# Patient Record
Sex: MALE | Race: BLACK OR AFRICAN AMERICAN | Employment: UNEMPLOYED
[De-identification: names, ages, dates, MRNs, and addresses within clinical notes are randomized per-mention and may not be internally consistent; named-entity substitution may affect disease eponyms.]

---

## 2017-01-05 ENCOUNTER — OFFICE VISIT (OUTPATIENT)
Dept: BEHAVIORAL/MENTAL HEALTH | Facility: CLINIC | Age: 67
End: 2017-01-05

## 2017-01-05 DIAGNOSIS — F11.23 OPIOID DEPENDENCE WITH WITHDRAWAL (HCC): Primary | ICD-10-CM

## 2017-01-05 DIAGNOSIS — R52 PAIN: ICD-10-CM

## 2017-01-05 PROCEDURE — 90837 PSYTX W PT 60 MINUTES: CPT | Performed by: PSYCHOLOGIST

## 2017-01-06 ENCOUNTER — TELEPHONE (OUTPATIENT)
Dept: INTERNAL MEDICINE | Facility: CLINIC | Age: 67
End: 2017-01-06

## 2017-01-09 ENCOUNTER — CASE MANAGEMENT (OUTPATIENT)
Dept: BEHAVIORAL/MENTAL HEALTH | Facility: CLINIC | Age: 67
End: 2017-01-09

## 2017-03-01 ENCOUNTER — OFFICE VISIT (OUTPATIENT)
Dept: INTERNAL MEDICINE | Facility: CLINIC | Age: 67
End: 2017-03-01

## 2017-03-01 ENCOUNTER — HOSPITAL ENCOUNTER (OUTPATIENT)
Age: 67
Discharge: HOME | End: 2017-03-01
Payer: MEDICARE

## 2017-03-01 VITALS
HEART RATE: 80 BPM | SYSTOLIC BLOOD PRESSURE: 138 MMHG | WEIGHT: 219 LBS | HEIGHT: 70 IN | DIASTOLIC BLOOD PRESSURE: 88 MMHG | BODY MASS INDEX: 31.35 KG/M2

## 2017-03-01 DIAGNOSIS — F11.20 UNCOMPLICATED OPIOID DEPENDENCE (HCC): Primary | ICD-10-CM

## 2017-03-01 PROCEDURE — 99212 OFFICE O/P EST SF 10 MIN: CPT | Performed by: INTERNAL MEDICINE

## 2017-03-01 PROCEDURE — 99211 OFF/OP EST MAY X REQ PHY/QHP: CPT | Performed by: INTERNAL MEDICINE

## 2017-03-01 RX ORDER — NAPROXEN 500 MG/1
1 TABLET ORAL 2 TIMES DAILY
COMMUNITY
Start: 2017-02-23 | End: 2017-06-22 | Stop reason: SDUPTHER

## 2017-04-20 ENCOUNTER — OFFICE VISIT (OUTPATIENT)
Dept: INTERNAL MEDICINE | Age: 67
End: 2017-04-20
Payer: MEDICARE

## 2017-04-20 VITALS
DIASTOLIC BLOOD PRESSURE: 75 MMHG | HEIGHT: 70 IN | SYSTOLIC BLOOD PRESSURE: 120 MMHG | HEART RATE: 60 BPM | BODY MASS INDEX: 30.64 KG/M2 | WEIGHT: 214 LBS

## 2017-04-20 DIAGNOSIS — G47.33 OSA (OBSTRUCTIVE SLEEP APNEA): Primary | ICD-10-CM

## 2017-04-20 DIAGNOSIS — J06.9 UPPER RESPIRATORY TRACT INFECTION, UNSPECIFIED TYPE: ICD-10-CM

## 2017-04-20 DIAGNOSIS — H91.93 BILATERAL HEARING LOSS: ICD-10-CM

## 2017-04-20 DIAGNOSIS — I10 ESSENTIAL HYPERTENSION: ICD-10-CM

## 2017-04-20 PROCEDURE — G8427 DOCREV CUR MEDS BY ELIG CLIN: HCPCS | Performed by: INTERNAL MEDICINE

## 2017-04-20 PROCEDURE — 1036F TOBACCO NON-USER: CPT | Performed by: INTERNAL MEDICINE

## 2017-04-20 PROCEDURE — G8417 CALC BMI ABV UP PARAM F/U: HCPCS | Performed by: INTERNAL MEDICINE

## 2017-04-20 PROCEDURE — 99213 OFFICE O/P EST LOW 20 MIN: CPT

## 2017-04-20 PROCEDURE — 3017F COLORECTAL CA SCREEN DOC REV: CPT | Performed by: INTERNAL MEDICINE

## 2017-04-20 PROCEDURE — 4040F PNEUMOC VAC/ADMIN/RCVD: CPT | Performed by: INTERNAL MEDICINE

## 2017-04-20 PROCEDURE — 1123F ACP DISCUSS/DSCN MKR DOCD: CPT | Performed by: INTERNAL MEDICINE

## 2017-04-20 PROCEDURE — 99214 OFFICE O/P EST MOD 30 MIN: CPT | Performed by: INTERNAL MEDICINE

## 2017-04-20 RX ORDER — IPRATROPIUM/ALBUTEROL SULFATE 20-100 MCG
1 MIST INHALER (GRAM) INHALATION EVERY 4 HOURS PRN
Status: CANCELLED | OUTPATIENT
Start: 2017-04-20

## 2017-04-20 RX ORDER — AZITHROMYCIN 250 MG/1
TABLET, FILM COATED ORAL
Qty: 1 PACKET | Refills: 0 | Status: SHIPPED | OUTPATIENT
Start: 2017-04-20 | End: 2017-04-30

## 2017-04-20 RX ORDER — BUPRENORPHINE HYDROCHLORIDE, NALOXONE HYDROCHLORIDE 8; 2 MG/1; MG/1
FILM, SOLUBLE BUCCAL; SUBLINGUAL
COMMUNITY
Start: 2017-04-14 | End: 2017-04-20 | Stop reason: SINTOL

## 2017-04-20 RX ORDER — GUAIFENESIN/DEXTROMETHORPHAN 100-10MG/5
5 SYRUP ORAL 3 TIMES DAILY PRN
Qty: 120 ML | Refills: 0 | Status: SHIPPED | OUTPATIENT
Start: 2017-04-20 | End: 2017-04-30

## 2017-04-20 RX ORDER — METHADONE HYDROCHLORIDE 10 MG/1
30 TABLET ORAL DAILY
COMMUNITY
End: 2017-06-22 | Stop reason: ALTCHOICE

## 2017-04-20 RX ORDER — DIPHENHYDRAMINE HCL 25 MG
25 CAPSULE ORAL NIGHTLY PRN
Qty: 30 CAPSULE | Refills: 1 | Status: SHIPPED | OUTPATIENT
Start: 2017-04-20 | End: 2017-06-22 | Stop reason: SDUPTHER

## 2017-05-04 ENCOUNTER — HOSPITAL ENCOUNTER (OUTPATIENT)
Dept: SLEEP CENTER | Age: 67
Discharge: HOME | End: 2017-05-04
Payer: MEDICARE

## 2017-05-04 VITALS — WEIGHT: 201 LBS | BODY MASS INDEX: 28.77 KG/M2 | HEIGHT: 70 IN

## 2017-05-04 DIAGNOSIS — G47.33 OSA (OBSTRUCTIVE SLEEP APNEA): ICD-10-CM

## 2017-05-04 PROCEDURE — 95811 POLYSOM 6/>YRS CPAP 4/> PARM: CPT

## 2017-05-04 ASSESSMENT — SLEEP AND FATIGUE QUESTIONNAIRES: NECK CIRCUMFERENCE (INCHES): 43.5

## 2017-05-25 ENCOUNTER — TELEPHONE (OUTPATIENT)
Dept: INTERNAL MEDICINE | Age: 67
End: 2017-05-25

## 2017-06-22 ENCOUNTER — HOSPITAL ENCOUNTER (OUTPATIENT)
Age: 67
Setting detail: SPECIMEN
Discharge: HOME | End: 2017-06-22
Payer: MEDICARE

## 2017-06-22 ENCOUNTER — OFFICE VISIT (OUTPATIENT)
Dept: INTERNAL MEDICINE | Age: 67
End: 2017-06-22
Payer: MEDICARE

## 2017-06-22 VITALS
DIASTOLIC BLOOD PRESSURE: 73 MMHG | WEIGHT: 208 LBS | SYSTOLIC BLOOD PRESSURE: 118 MMHG | HEIGHT: 70 IN | BODY MASS INDEX: 29.78 KG/M2 | TEMPERATURE: 98.2 F | HEART RATE: 83 BPM

## 2017-06-22 DIAGNOSIS — Z11.59 NEED FOR HEPATITIS C SCREENING TEST: Primary | ICD-10-CM

## 2017-06-22 DIAGNOSIS — L29.9 ITCHING: ICD-10-CM

## 2017-06-22 DIAGNOSIS — K62.89 RECTAL IRRITATION: ICD-10-CM

## 2017-06-22 DIAGNOSIS — Z11.59 NEED FOR HEPATITIS C SCREENING TEST: ICD-10-CM

## 2017-06-22 DIAGNOSIS — N40.1 BENIGN PROSTATIC HYPERPLASIA WITH LOWER URINARY TRACT SYMPTOMS, UNSPECIFIED MORPHOLOGY: ICD-10-CM

## 2017-06-22 DIAGNOSIS — J30.2 SEASONAL ALLERGIC RHINITIS, UNSPECIFIED ALLERGIC RHINITIS TRIGGER: ICD-10-CM

## 2017-06-22 DIAGNOSIS — M54.32 SCIATICA OF LEFT SIDE: ICD-10-CM

## 2017-06-22 LAB — HEPATITIS C ANTIBODY: REACTIVE

## 2017-06-22 PROCEDURE — 1123F ACP DISCUSS/DSCN MKR DOCD: CPT | Performed by: INTERNAL MEDICINE

## 2017-06-22 PROCEDURE — 1036F TOBACCO NON-USER: CPT | Performed by: INTERNAL MEDICINE

## 2017-06-22 PROCEDURE — 99214 OFFICE O/P EST MOD 30 MIN: CPT | Performed by: INTERNAL MEDICINE

## 2017-06-22 PROCEDURE — G8417 CALC BMI ABV UP PARAM F/U: HCPCS | Performed by: INTERNAL MEDICINE

## 2017-06-22 PROCEDURE — 86803 HEPATITIS C AB TEST: CPT

## 2017-06-22 PROCEDURE — 36415 COLL VENOUS BLD VENIPUNCTURE: CPT

## 2017-06-22 PROCEDURE — 3017F COLORECTAL CA SCREEN DOC REV: CPT | Performed by: INTERNAL MEDICINE

## 2017-06-22 PROCEDURE — 4040F PNEUMOC VAC/ADMIN/RCVD: CPT | Performed by: INTERNAL MEDICINE

## 2017-06-22 PROCEDURE — 99213 OFFICE O/P EST LOW 20 MIN: CPT

## 2017-06-22 PROCEDURE — G8427 DOCREV CUR MEDS BY ELIG CLIN: HCPCS | Performed by: INTERNAL MEDICINE

## 2017-06-22 RX ORDER — TRIAMCINOLONE ACETONIDE 0.25 MG/ML
LOTION TOPICAL 2 TIMES DAILY
Qty: 2 BOTTLE | Refills: 1 | Status: SHIPPED | OUTPATIENT
Start: 2017-06-22

## 2017-06-22 RX ORDER — GABAPENTIN 300 MG/1
CAPSULE ORAL
Qty: 90 CAPSULE | Refills: 3 | Status: SHIPPED | OUTPATIENT
Start: 2017-06-22

## 2017-06-22 RX ORDER — ERGOCALCIFEROL 1.25 MG/1
50000 CAPSULE ORAL WEEKLY
Qty: 6 CAPSULE | Refills: 0 | Status: CANCELLED | OUTPATIENT
Start: 2017-06-22

## 2017-06-22 RX ORDER — M-VIT,TX,IRON,MINS/CALC/FOLIC 27MG-0.4MG
1 TABLET ORAL DAILY
Qty: 30 TABLET | Refills: 11 | Status: SHIPPED | OUTPATIENT
Start: 2017-06-22

## 2017-06-22 RX ORDER — TAMSULOSIN HYDROCHLORIDE 0.4 MG/1
0.4 CAPSULE ORAL DAILY
Qty: 30 CAPSULE | Refills: 3 | Status: SHIPPED | OUTPATIENT
Start: 2017-06-22

## 2017-06-22 RX ORDER — DICYCLOMINE HYDROCHLORIDE 10 MG/1
10 CAPSULE ORAL EVERY 6 HOURS PRN
Qty: 20 CAPSULE | Refills: 0 | Status: SHIPPED | OUTPATIENT
Start: 2017-06-22 | End: 2022-02-23 | Stop reason: SDUPTHER

## 2017-06-22 RX ORDER — IBUPROFEN 800 MG/1
800 TABLET ORAL EVERY 8 HOURS PRN
Qty: 30 TABLET | Refills: 0 | Status: CANCELLED | OUTPATIENT
Start: 2017-06-22

## 2017-06-22 RX ORDER — MELOXICAM 7.5 MG/1
TABLET ORAL
Qty: 30 TABLET | Refills: 1 | Status: CANCELLED | OUTPATIENT
Start: 2017-06-22

## 2017-06-22 RX ORDER — NAPROXEN 500 MG/1
500 TABLET ORAL 2 TIMES DAILY
Qty: 60 TABLET | Refills: 1 | Status: SHIPPED | OUTPATIENT
Start: 2017-06-22

## 2017-06-22 RX ORDER — DIPHENHYDRAMINE HCL 25 MG
25 CAPSULE ORAL NIGHTLY PRN
Qty: 30 CAPSULE | Refills: 1 | Status: SHIPPED | OUTPATIENT
Start: 2017-06-22

## 2017-06-22 RX ORDER — FLUTICASONE PROPIONATE 50 MCG
1 SPRAY, SUSPENSION (ML) NASAL DAILY
Qty: 1 BOTTLE | Refills: 3 | Status: SHIPPED | OUTPATIENT
Start: 2017-06-22

## 2017-06-22 RX ORDER — IPRATROPIUM/ALBUTEROL SULFATE 20-100 MCG
1 MIST INHALER (GRAM) INHALATION EVERY 4 HOURS
Qty: 1 INHALER | Refills: 3 | Status: SHIPPED | OUTPATIENT
Start: 2017-06-22

## 2017-06-26 ENCOUNTER — TELEPHONE (OUTPATIENT)
Dept: INTERNAL MEDICINE | Age: 67
End: 2017-06-26

## 2017-07-26 ENCOUNTER — TELEPHONE (OUTPATIENT)
Dept: INTERNAL MEDICINE | Age: 67
End: 2017-07-26

## 2017-08-10 DIAGNOSIS — G47.33 OSA (OBSTRUCTIVE SLEEP APNEA): ICD-10-CM

## 2018-05-01 ENCOUNTER — TELEPHONE (OUTPATIENT)
Dept: INTERNAL MEDICINE | Age: 68
End: 2018-05-01

## 2018-06-12 ENCOUNTER — TELEPHONE (OUTPATIENT)
Dept: INTERNAL MEDICINE | Age: 68
End: 2018-06-12

## 2020-02-29 ENCOUNTER — HOSPITAL ENCOUNTER (EMERGENCY)
Age: 70
Discharge: HOME | End: 2020-02-29
Attending: EMERGENCY MEDICINE
Payer: MEDICARE

## 2020-02-29 ENCOUNTER — APPOINTMENT (OUTPATIENT)
Dept: CT IMAGING | Age: 70
End: 2020-02-29
Payer: MEDICARE

## 2020-02-29 VITALS
BODY MASS INDEX: 29.84 KG/M2 | SYSTOLIC BLOOD PRESSURE: 143 MMHG | RESPIRATION RATE: 24 BRPM | OXYGEN SATURATION: 99 % | TEMPERATURE: 98.2 F | HEART RATE: 98 BPM | DIASTOLIC BLOOD PRESSURE: 89 MMHG | WEIGHT: 208 LBS

## 2020-02-29 LAB
ABSOLUTE EOS #: 0.2 K/UL (ref 0–0.44)
ABSOLUTE IMMATURE GRANULOCYTE: <0.03 K/UL (ref 0–0.3)
ABSOLUTE LYMPH #: 3.79 K/UL (ref 1.1–3.7)
ABSOLUTE MONO #: 0.94 K/UL (ref 0.1–1.2)
ALBUMIN SERPL-MCNC: 3.7 G/DL (ref 3.5–5.2)
ALBUMIN/GLOBULIN RATIO: 1 (ref 1–2.5)
ALP BLD-CCNC: 67 U/L (ref 40–129)
ALT SERPL-CCNC: 38 U/L (ref 5–41)
ANION GAP SERPL CALCULATED.3IONS-SCNC: 11 MMOL/L (ref 9–17)
AST SERPL-CCNC: 31 U/L
BASOPHILS # BLD: 1 % (ref 0–2)
BASOPHILS ABSOLUTE: 0.04 K/UL (ref 0–0.2)
BILIRUB SERPL-MCNC: 0.29 MG/DL (ref 0.3–1.2)
BILIRUBIN URINE: NEGATIVE
BUN BLDV-MCNC: 11 MG/DL (ref 8–23)
BUN/CREAT BLD: ABNORMAL (ref 9–20)
CALCIUM SERPL-MCNC: 8.9 MG/DL (ref 8.6–10.4)
CHLORIDE BLD-SCNC: 104 MMOL/L (ref 98–107)
CO2: 22 MMOL/L (ref 20–31)
COLOR: YELLOW
COMMENT UA: NORMAL
CREAT SERPL-MCNC: 0.79 MG/DL (ref 0.7–1.2)
DIFFERENTIAL TYPE: ABNORMAL
EOSINOPHILS RELATIVE PERCENT: 3 % (ref 1–4)
GFR AFRICAN AMERICAN: >60 ML/MIN
GFR NON-AFRICAN AMERICAN: >60 ML/MIN
GFR SERPL CREATININE-BSD FRML MDRD: ABNORMAL ML/MIN/{1.73_M2}
GFR SERPL CREATININE-BSD FRML MDRD: ABNORMAL ML/MIN/{1.73_M2}
GLUCOSE BLD-MCNC: 101 MG/DL (ref 70–99)
GLUCOSE URINE: NEGATIVE
HCT VFR BLD CALC: 41.9 % (ref 40.7–50.3)
HEMOGLOBIN: 14.5 G/DL (ref 13–17)
IMMATURE GRANULOCYTES: 0 %
KETONES, URINE: NEGATIVE
LEUKOCYTE ESTERASE, URINE: NEGATIVE
LIPASE: 94 U/L (ref 13–60)
LYMPHOCYTES # BLD: 53 % (ref 24–43)
MCH RBC QN AUTO: 33.3 PG (ref 25.2–33.5)
MCHC RBC AUTO-ENTMCNC: 34.6 G/DL (ref 28.4–34.8)
MCV RBC AUTO: 96.3 FL (ref 82.6–102.9)
MONOCYTES # BLD: 13 % (ref 3–12)
NITRITE, URINE: NEGATIVE
NRBC AUTOMATED: 0 PER 100 WBC
PDW BLD-RTO: 12.9 % (ref 11.8–14.4)
PH UA: 5.5 (ref 5–8)
PLATELET # BLD: 211 K/UL (ref 138–453)
PLATELET ESTIMATE: ABNORMAL
PMV BLD AUTO: 11.1 FL (ref 8.1–13.5)
POTASSIUM SERPL-SCNC: 4.4 MMOL/L (ref 3.7–5.3)
PROTEIN UA: NEGATIVE
RBC # BLD: 4.35 M/UL (ref 4.21–5.77)
RBC # BLD: ABNORMAL 10*6/UL
SEG NEUTROPHILS: 30 % (ref 36–65)
SEGMENTED NEUTROPHILS ABSOLUTE COUNT: 2.09 K/UL (ref 1.5–8.1)
SODIUM BLD-SCNC: 137 MMOL/L (ref 135–144)
SPECIFIC GRAVITY UA: 1.03 (ref 1–1.03)
TOTAL PROTEIN: 7.4 G/DL (ref 6.4–8.3)
TURBIDITY: CLEAR
URINE HGB: NEGATIVE
UROBILINOGEN, URINE: NORMAL
WBC # BLD: 7.1 K/UL (ref 3.5–11.3)
WBC # BLD: ABNORMAL 10*3/UL

## 2020-02-29 PROCEDURE — 6360000004 HC RX CONTRAST MEDICATION: Performed by: STUDENT IN AN ORGANIZED HEALTH CARE EDUCATION/TRAINING PROGRAM

## 2020-02-29 PROCEDURE — 83690 ASSAY OF LIPASE: CPT

## 2020-02-29 PROCEDURE — 80053 COMPREHEN METABOLIC PANEL: CPT

## 2020-02-29 PROCEDURE — 74177 CT ABD & PELVIS W/CONTRAST: CPT

## 2020-02-29 PROCEDURE — 85025 COMPLETE CBC W/AUTO DIFF WBC: CPT

## 2020-02-29 PROCEDURE — 81003 URINALYSIS AUTO W/O SCOPE: CPT

## 2020-02-29 PROCEDURE — 99284 EMERGENCY DEPT VISIT MOD MDM: CPT

## 2020-02-29 PROCEDURE — 6360000002 HC RX W HCPCS: Performed by: STUDENT IN AN ORGANIZED HEALTH CARE EDUCATION/TRAINING PROGRAM

## 2020-02-29 PROCEDURE — 96374 THER/PROPH/DIAG INJ IV PUSH: CPT

## 2020-02-29 PROCEDURE — 96375 TX/PRO/DX INJ NEW DRUG ADDON: CPT

## 2020-02-29 RX ORDER — MORPHINE SULFATE 4 MG/ML
INJECTION, SOLUTION INTRAMUSCULAR; INTRAVENOUS
Status: DISCONTINUED
Start: 2020-02-29 | End: 2020-02-29 | Stop reason: HOSPADM

## 2020-02-29 RX ORDER — MORPHINE SULFATE 4 MG/ML
4 INJECTION, SOLUTION INTRAMUSCULAR; INTRAVENOUS ONCE
Status: COMPLETED | OUTPATIENT
Start: 2020-02-29 | End: 2020-02-29

## 2020-02-29 RX ORDER — ACETAMINOPHEN 500 MG
1000 TABLET ORAL EVERY 6 HOURS PRN
Qty: 30 TABLET | Refills: 0 | Status: SHIPPED | OUTPATIENT
Start: 2020-02-29

## 2020-02-29 RX ORDER — ONDANSETRON 2 MG/ML
INJECTION INTRAMUSCULAR; INTRAVENOUS
Status: DISCONTINUED
Start: 2020-02-29 | End: 2020-02-29 | Stop reason: HOSPADM

## 2020-02-29 RX ORDER — DOCUSATE SODIUM 100 MG/1
100 CAPSULE, LIQUID FILLED ORAL 2 TIMES DAILY
Qty: 30 CAPSULE | Refills: 0 | Status: SHIPPED | OUTPATIENT
Start: 2020-02-29

## 2020-02-29 RX ORDER — ONDANSETRON 2 MG/ML
4 INJECTION INTRAMUSCULAR; INTRAVENOUS ONCE
Status: COMPLETED | OUTPATIENT
Start: 2020-02-29 | End: 2020-02-29

## 2020-02-29 RX ADMIN — ONDANSETRON 4 MG: 2 INJECTION INTRAMUSCULAR; INTRAVENOUS at 03:13

## 2020-02-29 RX ADMIN — MORPHINE SULFATE 4 MG: 4 INJECTION INTRAVENOUS at 03:13

## 2020-02-29 RX ADMIN — IOHEXOL 75 ML: 350 INJECTION, SOLUTION INTRAVENOUS at 03:52

## 2020-02-29 ASSESSMENT — PAIN DESCRIPTION - LOCATION: LOCATION: RECTUM

## 2020-02-29 ASSESSMENT — ENCOUNTER SYMPTOMS
CONSTIPATION: 0
VOMITING: 0
NAUSEA: 0
RECTAL PAIN: 1
EYE REDNESS: 0
CHEST TIGHTNESS: 0
ABDOMINAL PAIN: 0
EYE DISCHARGE: 0
ABDOMINAL DISTENTION: 0
SHORTNESS OF BREATH: 0

## 2020-02-29 ASSESSMENT — PAIN SCALES - GENERAL
PAINLEVEL_OUTOF10: 10
PAINLEVEL_OUTOF10: 10

## 2020-02-29 ASSESSMENT — PAIN DESCRIPTION - PAIN TYPE: TYPE: ACUTE PAIN

## 2020-02-29 ASSESSMENT — PAIN DESCRIPTION - DESCRIPTORS: DESCRIPTORS: STABBING

## 2020-02-29 NOTE — ED NOTES
Rectal exam preformed resident. No hemorrhoids noted. No internal hemorrhoids noted. No pain with palpitation in rectum.       Darron Coats RN  02/29/20 0305       Darron Coats RN  02/29/20 7376

## 2020-02-29 NOTE — ED NOTES
Pt presented to ED with complaints of rectal pain stating \"the turbulence in my rectum is such severe pain. I have something wrong with my prostate. Its causing me to lose my hearing and have my bones sucked dry\". Pt states being treated at [facility] for prostate problems. Pt denies abd pain. Pt denies n,v,d. Pt states urinating small amounts and its \"black and thin\". Pt alert and oriented x 4. RR even and non labored.       Saloni Kiran RN  02/29/20 1444

## 2020-03-01 NOTE — ED PROVIDER NOTES
[facility]  ED  Emergency Department Encounter  Emergency Medicine Resident     Pt Name: Marcelino Hall  MRN: [id number]  [place] 1950  Date of evaluation: 2/29/20  PCP:  Octavio Andrade MD    CHIEF COMPLAINT       Chief Complaint   Patient presents with    Rectal Pain     being treated at [facility] for this pain in rectum. HISTORY OFPRESENT ILLNESS  (Location/Symptom, Timing/Onset, Context/Setting, Quality, Duration, Modifying Factors,Severity.)      Marcelino Hall is a 78 y.o. male who presents with abdominal pain and rectal pain. Patient states he has been seen at [facility] for prostate problems. States that \"the turbulence in my rectum is causing such severe pain. It is causing me to lose my hearing and have my bone sucked dry. \"  Endorses decreased urination and states that his urine is dark and thin. Denies nausea, vomiting, fevers. Denies any abdominal pain. Denies any history of abdominal surgeries but states that he does have history of lumbar disc surgery and lower back problems. Denies any lumbar pain. PAST MEDICAL / SURGICAL / SOCIAL / FAMILY HISTORY      has a past medical history of Arthritis and COPD (chronic obstructive pulmonary disease) ([facility] [address] has a past surgical history that includes Lumbar disc surgery and Circumcision.     Social History     Socioeconomic History    Marital status: Legally      Spouse name: Not on file    Number of children: Not on file    Years of education: Not on file    Highest education level: Not on file   Occupational History    Not on file   Social Needs    Financial resource strain: Not on file    Food insecurity:     Worry: Not on file     Inability: Not on file    Transportation needs:     Medical: Not on file     Non-medical: Not on file   Tobacco Use    Smoking status: Former Smoker    Smokeless tobacco: Never Used   Substance and Sexual Activity    Alcohol use: No    Drug use: No    Sexual activity: Not on needed for Wheezing 6/22/17   Sally Kiran MD   COMBIVENT RESPIMAT  MCG/ACT AERS inhaler Inhale 1 puff into the lungs every 4 hours 6/22/17   Sally Kiran MD   naproxen (NAPROSYN) 500 MG tablet Take 1 tablet by mouth 2 times daily 6/22/17   aSlly Kiran MD   triamcinolone (KENALOG) 0.025 % LOTN lotion Apply topically 2 times daily 6/22/17   Sally Kiran MD   Pramox-PE-Glycerin-Petrolatum (PREPARATION H) 1-0.25-14.4-15 % CREA rectal cream Place 1 each rectally 2 times daily 6/22/17   Sally Kiran MD   fluticasone Ariane Pedro) 50 MCG/ACT nasal spray 1 spray by Nasal route daily 6/22/17   Sally Kiran MD   ibuprofen (ADVIL;MOTRIN) 800 MG tablet Take 1 tablet by mouth every 8 hours as needed for Pain 1/8/17   Yesi Light MD   vitamin D (ERGOCALCIFEROL) 15727 UNITS CAPS capsule Take 1 capsule by mouth once a week 11/15/16   Sobeida Conn MD   meloxicam (MOBIC) 7.5 MG tablet One daily for pain 11/10/16   Arianne Mack MD       REVIEW OF SYSTEMS    (2-9 systems for level 4, 10 or more for level 5)      Review of Systems   Constitutional: Negative for chills and fever. Eyes: Negative for discharge and redness. Respiratory: Negative for chest tightness and shortness of breath. Cardiovascular: Negative for chest pain. Gastrointestinal: Positive for rectal pain. Negative for abdominal distention, abdominal pain, constipation, nausea and vomiting. Genitourinary: Positive for decreased urine volume. Negative for dysuria and flank pain. Musculoskeletal: Negative for myalgias. Skin: Negative for rash. Allergic/Immunologic: Negative for environmental allergies. Neurological: Negative for headaches. Psychiatric/Behavioral: Negative for agitation and confusion. PHYSICAL EXAM   (up to 7 for level 4, 8 or more for level 5)     INITIAL VITALS:    weight is 208 lb (94.3 kg). His oral temperature is 98.2 °F (36.8 °C).  His blood pressure is 143/89 (abnormal) and [facility] injection 4 mg    morphine injection 4 mg    DISCONTD: morphine 4 MG/ML injection     Gladieux, Hali: cabinet override    DISCONTD: ondansetron (ZOFRAN) 4 MG/2ML injection     Gladieux, Hali: cabinet override    iohexol (OMNIPAQUE 350) solution 75 mL    acetaminophen (APAP EXTRA STRENGTH) 500 MG tablet     Sig: Take 2 tablets by mouth every 6 hours as needed for Pain     Dispense:  30 tablet     Refill:  0    docusate sodium (COLACE) 100 MG capsule     Sig: Take 1 capsule by mouth 2 times daily     Dispense:  30 capsule     Refill:  0       DDX: Rectal prolapse versus hemorrhoids versus urine retention versus appendicitis versus obstruction versus pancreatitis versus electrolyte abnormality versus urinary tract infection versus prostatitis    Initial MDM/Plan: 78 y.o. male who presents with rectal pain patient very agitated. Stating that no one is understanding what he is going through. Rectal exam with no pain. Patient recently treated for prostatitis with antibiotics. Due to unclear symptoms and unclear symptomatology will get laboratory work-up as well as urine and CT scan of abdomen rule out any acute intra-abdominal process.     DIAGNOSTIC RESULTS / EMERGENCY DEPARTMENT COURSE / MDM     LABS:  Labs Reviewed   CBC WITH AUTO DIFFERENTIAL - Abnormal; Notable for the following components:       Result Value    Seg Neutrophils 30 (*)     Lymphocytes 53 (*)     Monocytes 13 (*)     Absolute Lymph # 3.79 (*)     All other components within normal limits   COMPREHENSIVE METABOLIC PANEL - Abnormal; Notable for the following components:    Glucose 101 (*)     Total Bilirubin 0.29 (*)     All other components within normal limits   LIPASE - Abnormal; Notable for the following components:    Lipase 94 (*)     All other components within normal limits   URINE RT REFLEX TO CULTURE         RADIOLOGY:  Ct Abdomen Pelvis W Iv Contrast    Result Date: 2/29/2020  EXAMINATION: CT OF THE ABDOMEN AND PELVIS WITH CONTRAST 2/29/2020 3:44 am TECHNIQUE: CT of the abdomen and pelvis was performed with the administration of intravenous contrast. Multiplanar reformatted images are provided for review. Dose modulation, iterative reconstruction, and/or weight based adjustment of the mA/kV was utilized to reduce the radiation dose to as low as reasonably achievable. COMPARISON: None. HISTORY: ORDERING SYSTEM PROVIDED HISTORY: Rectal and suprapubic pain TECHNOLOGIST PROVIDED HISTORY: IV Only Contrast Rectal and suprapubic pain Reason for Exam: Rectal and suprapubic pain Acuity: Acute Type of Exam: Initial FINDINGS: Lower Chest: No acute abnormality. Liver: Few scattered subcentimeter hypodensities in the right and left hepatic lobes likely represent benign cysts. Smooth liver contour. Gallbladder and Bile Ducts: Normal. Spleen: Normal. Adrenal Glands: Normal. Pancreas: Normal. Genitourinary: Ovoid 1.0 cm area of hypoattenuation in the anterior left kidney likely represents a benign cyst.  No urinary stones or hydronephrosis. Both ureters are normal in course and caliber. Normal urinary bladder. Prostate gland measures 4.1 x 5.2 cm. Seminal vesicles are grossly symmetric. Bowel: Normal caliber bowel. Normal appendix. Colonic diverticulosis without acute diverticulitis. Vasculature: Atherosclerosis. No abdominal aortic aneurysm. Patent portal vein. Bones and Soft Tissues: Degenerative changes in the visualized spine and pelvis. Retroperitoneum/Mesentery: No intraperitoneal free air, ascites or fluid collection. No lymphadenopathy in the abdomen or pelvis. No acute abnormality in the abdomen or pelvis. Normal caliber bowel and normal appendix. Colonic diverticulosis without acute diverticulitis. EMERGENCY DEPARTMENT COURSE:  ED Course as of Feb 29 1918   Sat Feb 29, 2020   3621 CT scan negative. Will await urine and anticipate discharge.     [MS]      ED Course User Index  [MS] Sandro Oneal DO     Patient

## 2021-12-18 ENCOUNTER — HOSPITAL ENCOUNTER (EMERGENCY)
Age: 71
Discharge: HOME | End: 2021-12-18
Attending: EMERGENCY MEDICINE
Payer: MEDICARE

## 2021-12-18 ENCOUNTER — APPOINTMENT (OUTPATIENT)
Dept: GENERAL RADIOLOGY | Age: 71
End: 2021-12-18
Payer: MEDICARE

## 2021-12-18 VITALS
DIASTOLIC BLOOD PRESSURE: 83 MMHG | OXYGEN SATURATION: 99 % | RESPIRATION RATE: 16 BRPM | SYSTOLIC BLOOD PRESSURE: 128 MMHG | HEART RATE: 71 BPM | TEMPERATURE: 98.8 F
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
## 2021-12-18 DIAGNOSIS — M25.511 ACUTE PAIN OF RIGHT SHOULDER: ICD-10-CM

## 2021-12-18 DIAGNOSIS — M79.671 RIGHT FOOT PAIN: Primary | ICD-10-CM

## 2021-12-18 PROCEDURE — 6370000000 HC RX 637 (ALT 250 FOR IP): Performed by: STUDENT IN AN ORGANIZED HEALTH CARE EDUCATION/TRAINING PROGRAM

## 2021-12-18 PROCEDURE — 73630 X-RAY EXAM OF FOOT: CPT

## 2021-12-18 PROCEDURE — 73030 X-RAY EXAM OF SHOULDER: CPT

## 2021-12-18 PROCEDURE — 99282 EMERGENCY DEPT VISIT SF MDM: CPT

## 2021-12-18 PROCEDURE — 73610 X-RAY EXAM OF ANKLE: CPT

## 2021-12-18 RX ORDER — ACETAMINOPHEN 500 MG
1000 TABLET ORAL ONCE
Status: COMPLETED | OUTPATIENT
Start: 2021-12-18 | End: 2021-12-18

## 2021-12-18 RX ADMIN — ACETAMINOPHEN 1000 MG: 500 TABLET ORAL at 04:10

## 2021-12-18 ASSESSMENT — ENCOUNTER SYMPTOMS
BACK PAIN: 0
COUGH: 0
NAUSEA: 0
RHINORRHEA: 0
VOMITING: 0
ABDOMINAL PAIN: 0
SHORTNESS OF BREATH: 0

## 2021-12-18 ASSESSMENT — PAIN SCALES - GENERAL: PAINLEVEL_OUTOF10: 8
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
## 2022-02-23 ENCOUNTER — HOSPITAL ENCOUNTER (EMERGENCY)
Age: 72
Discharge: HOME | End: 2022-02-23
Attending: EMERGENCY MEDICINE
Payer: MEDICARE

## 2022-02-23 ENCOUNTER — APPOINTMENT (OUTPATIENT)
Dept: CT IMAGING | Age: 72
End: 2022-02-23
Payer: MEDICARE

## 2022-02-23 VITALS
RESPIRATION RATE: 18 BRPM | BODY MASS INDEX: 30.21 KG/M2 | OXYGEN SATURATION: 99 % | HEIGHT: 70 IN | SYSTOLIC BLOOD PRESSURE: 150 MMHG | WEIGHT: 211 LBS | HEART RATE: 76 BPM | TEMPERATURE: 97 F | DIASTOLIC BLOOD PRESSURE: 84 MMHG

## 2022-02-23 DIAGNOSIS — K59.4 PROCTALGIA FUGAX: Primary | ICD-10-CM

## 2022-02-23 LAB
-: NORMAL
ABSOLUTE EOS #: 0.26 K/UL (ref 0–0.44)
ABSOLUTE IMMATURE GRANULOCYTE: <0.03 K/UL (ref 0–0.3)
ABSOLUTE LYMPH #: 2.14 K/UL (ref 1.1–3.7)
ABSOLUTE MONO #: 0.64 K/UL (ref 0.1–1.2)
ALBUMIN SERPL-MCNC: 4.1 G/DL (ref 3.5–5.2)
ALBUMIN/GLOBULIN RATIO: 1.2 (ref 1–2.5)
ALP BLD-CCNC: 76 U/L (ref 40–129)
ALT SERPL-CCNC: 77 U/L (ref 5–41)
ANION GAP SERPL CALCULATED.3IONS-SCNC: 10 MMOL/L (ref 9–17)
AST SERPL-CCNC: 39 U/L
BASOPHILS # BLD: 1 % (ref 0–2)
BASOPHILS ABSOLUTE: 0.05 K/UL (ref 0–0.2)
BILIRUB SERPL-MCNC: 0.35 MG/DL (ref 0.3–1.2)
BILIRUBIN URINE: NEGATIVE
BUN BLDV-MCNC: 20 MG/DL (ref 8–23)
CALCIUM SERPL-MCNC: 8.8 MG/DL (ref 8.6–10.4)
CHLORIDE BLD-SCNC: 106 MMOL/L (ref 98–107)
CO2: 23 MMOL/L (ref 20–31)
COLOR: YELLOW
CREAT SERPL-MCNC: 0.91 MG/DL (ref 0.7–1.2)
EOSINOPHILS RELATIVE PERCENT: 5 % (ref 1–4)
EPITHELIAL CELLS UA: NORMAL /HPF (ref 0–5)
GFR AFRICAN AMERICAN: >60 ML/MIN
GFR NON-AFRICAN AMERICAN: >60 ML/MIN
GFR SERPL CREATININE-BSD FRML MDRD: ABNORMAL ML/MIN/{1.73_M2}
GLUCOSE BLD-MCNC: 118 MG/DL (ref 70–99)
GLUCOSE URINE: NEGATIVE
HCT VFR BLD CALC: 42.1 % (ref 40.7–50.3)
HEMOGLOBIN: 14.4 G/DL (ref 13–17)
IMMATURE GRANULOCYTES: 0 %
KETONES, URINE: NEGATIVE
LACTIC ACID, SEPSIS WHOLE BLOOD: 1.3 MMOL/L (ref 0.5–1.9)
LEUKOCYTE ESTERASE, URINE: NEGATIVE
LYMPHOCYTES # BLD: 38 % (ref 24–43)
MCH RBC QN AUTO: 32.6 PG (ref 25.2–33.5)
MCHC RBC AUTO-ENTMCNC: 34.2 G/DL (ref 28.4–34.8)
MCV RBC AUTO: 95.2 FL (ref 82.6–102.9)
MONOCYTES # BLD: 11 % (ref 3–12)
NITRITE, URINE: NEGATIVE
NRBC AUTOMATED: 0 PER 100 WBC
PDW BLD-RTO: 13 % (ref 11.8–14.4)
PH UA: 6.5 (ref 5–8)
PLATELET # BLD: 177 K/UL (ref 138–453)
PMV BLD AUTO: 10.9 FL (ref 8.1–13.5)
POTASSIUM SERPL-SCNC: 4.5 MMOL/L (ref 3.7–5.3)
PROTEIN UA: NEGATIVE
RBC # BLD: 4.42 M/UL (ref 4.21–5.77)
RBC UA: NORMAL /HPF (ref 0–4)
SEG NEUTROPHILS: 45 % (ref 36–65)
SEGMENTED NEUTROPHILS ABSOLUTE COUNT: 2.51 K/UL (ref 1.5–8.1)
SODIUM BLD-SCNC: 139 MMOL/L (ref 135–144)
SPECIFIC GRAVITY UA: 1.02 (ref 1–1.03)
TOTAL PROTEIN: 7.6 G/DL (ref 6.4–8.3)
TURBIDITY: CLEAR
URINE HGB: NEGATIVE
UROBILINOGEN, URINE: NORMAL
WBC # BLD: 5.6 K/UL (ref 3.5–11.3)
WBC UA: NORMAL /HPF (ref 0–5)

## 2022-02-23 PROCEDURE — 6370000000 HC RX 637 (ALT 250 FOR IP): Performed by: EMERGENCY MEDICINE

## 2022-02-23 PROCEDURE — 6360000002 HC RX W HCPCS: Performed by: EMERGENCY MEDICINE

## 2022-02-23 PROCEDURE — 74177 CT ABD & PELVIS W/CONTRAST: CPT

## 2022-02-23 PROCEDURE — 99283 EMERGENCY DEPT VISIT LOW MDM: CPT

## 2022-02-23 PROCEDURE — 83605 ASSAY OF LACTIC ACID: CPT

## 2022-02-23 PROCEDURE — 94640 AIRWAY INHALATION TREATMENT: CPT

## 2022-02-23 PROCEDURE — 85025 COMPLETE CBC W/AUTO DIFF WBC: CPT

## 2022-02-23 PROCEDURE — 80053 COMPREHEN METABOLIC PANEL: CPT

## 2022-02-23 PROCEDURE — 81001 URINALYSIS AUTO W/SCOPE: CPT

## 2022-02-23 PROCEDURE — 94761 N-INVAS EAR/PLS OXIMETRY MLT: CPT

## 2022-02-23 PROCEDURE — 96374 THER/PROPH/DIAG INJ IV PUSH: CPT

## 2022-02-23 PROCEDURE — 6360000004 HC RX CONTRAST MEDICATION: Performed by: EMERGENCY MEDICINE

## 2022-02-23 RX ORDER — DICYCLOMINE HYDROCHLORIDE 10 MG/1
10 CAPSULE ORAL EVERY 6 HOURS PRN
Qty: 20 CAPSULE | Refills: 0 | Status: SHIPPED | OUTPATIENT
Start: 2022-02-23

## 2022-02-23 RX ORDER — SIMETHICONE 80 MG
80 TABLET,CHEWABLE ORAL ONCE
Status: COMPLETED | OUTPATIENT
Start: 2022-02-23 | End: 2022-02-23

## 2022-02-23 RX ORDER — ALBUTEROL SULFATE 90 UG/1
2 AEROSOL, METERED RESPIRATORY (INHALATION) ONCE
Status: COMPLETED | OUTPATIENT
Start: 2022-02-23 | End: 2022-02-23

## 2022-02-23 RX ORDER — HYDROCORTISONE ACETATE 25 MG/1
25 SUPPOSITORY RECTAL ONCE
Status: DISCONTINUED | OUTPATIENT
Start: 2022-02-23 | End: 2022-02-23 | Stop reason: HOSPADM

## 2022-02-23 RX ORDER — HYDROCORTISONE ACETATE 25 MG/1
25 SUPPOSITORY RECTAL 2 TIMES DAILY
Qty: 14 SUPPOSITORY | Refills: 0 | Status: SHIPPED | OUTPATIENT
Start: 2022-02-23

## 2022-02-23 RX ORDER — SIMETHICONE 125 MG
125 TABLET,CHEWABLE ORAL EVERY 6 HOURS PRN
Qty: 60 TABLET | Refills: 0 | Status: SHIPPED | OUTPATIENT
Start: 2022-02-23

## 2022-02-23 RX ORDER — MORPHINE SULFATE 4 MG/ML
4 INJECTION, SOLUTION INTRAMUSCULAR; INTRAVENOUS ONCE
Status: COMPLETED | OUTPATIENT
Start: 2022-02-23 | End: 2022-02-23

## 2022-02-23 RX ORDER — DICYCLOMINE HYDROCHLORIDE 10 MG/1
10 CAPSULE ORAL ONCE
Status: COMPLETED | OUTPATIENT
Start: 2022-02-23 | End: 2022-02-23

## 2022-02-23 RX ADMIN — SIMETHICONE 80 MG: 80 TABLET, CHEWABLE ORAL at 10:13

## 2022-02-23 RX ADMIN — IOPAMIDOL 75 ML: 755 INJECTION, SOLUTION INTRAVENOUS at 10:04

## 2022-02-23 RX ADMIN — ALBUTEROL SULFATE 2 PUFF: 90 AEROSOL, METERED RESPIRATORY (INHALATION) at 11:28

## 2022-02-23 RX ADMIN — DICYCLOMINE HYDROCHLORIDE 10 MG: 10 CAPSULE ORAL at 07:50

## 2022-02-23 RX ADMIN — MORPHINE SULFATE 4 MG: 4 INJECTION INTRAVENOUS at 07:51

## 2022-02-23 ASSESSMENT — ENCOUNTER SYMPTOMS
EYE REDNESS: 0
TACHYPNEA: 1
NAUSEA: 0
SORE THROAT: 0
RECTAL PAIN: 1
BLOOD IN STOOL: 0
EYE PAIN: 0
COUGH: 0
DIARRHEA: 0
ABDOMINAL PAIN: 1
CONSTIPATION: 0
BACK PAIN: 0
SHORTNESS OF BREATH: 0
VOMITING: 0
RHINORRHEA: 0

## 2022-02-23 ASSESSMENT — PAIN SCALES - GENERAL
PAINLEVEL_OUTOF10: 8
PAINLEVEL_OUTOF10: 10

## 2022-02-23 NOTE — ED NOTES
This nurse and Dr. Roro Vazquez bedside for assessment. Pt states he came to ED for increase in rectal \"pain/pressure\". Pt states \"it feels like I have wind blowing out of my rectum at all times. \" pt states this has been going on \"for years\" and progressively getting worse. Pt states that bowel movements are inconsistent in color and consistency and states increase in urination. Denies odor or burning with urination. States bowel movements \"burn\" and that the flatulence \"burns\" when coming out. Pt denies abd pain relief when passing gas. States this started a few years back after a spinal surgery. Abdomen is soft, distended, and symmetric. Pt denies tenderness with palpation.       Ced Ramirez RN  02/23/22 8759

## 2022-02-23 NOTE — ED NOTES
Patient discharged home at this time. Pt verbalizes understanding of discharge instructions and denies questions at this time. Pt agreeable to follow up with GI for further evaluation. Pt ambulatory out of ED at this time.      Antonio Obando RN  02/23/22 7999

## 2022-02-23 NOTE — ED NOTES
Patient medicated per provider orders. Pt provided with urinal for urine sample and verbalizes understanding. Pt belching while nurse at bedside. Pt states he has a lot of gas to get out.       Sabina Santos RN  02/23/22 9197

## 2022-02-23 NOTE — ED NOTES
The following labs labeled with pt sticker and tubed to lab:     [x] Blue     [x] Lavender   [] on ice  [x] Green/yellow  [x] Green/black [x] on ice  [x] Yellow  [] Red  [] Pink      [] COVID-19 swab    [] Rapid  [] PCR  [] Flu swab  [] Peds Viral Panel     [] Urine Sample  [] Pelvic Cultures  [] Blood Cultures          Donn Tidwell RN  02/23/22 2996

## 2022-02-23 NOTE — ED NOTES
Patient back in ED room 8 from CT at this time. Pt revitalized and denies current needs.       Lucille Villa RN  02/23/22 1011

## 2022-02-23 NOTE — ED PROVIDER NOTES
[facility]  ED  EMERGENCY DEPARTMENT ENCOUNTER    Pt Name: Arcelia Clay  MRN: [id number]  [place] 1950  Date of evaluation: 2/23/22  CHIEF COMPLAINT       Chief Complaint   Patient presents with    Abdominal Pain     States abd pain, bloating and pressure related to a previous spinal surgery. HISTORY OF PRESENT ILLNESS   HPI  69-year-old male with abdominal pain, rectal pain, and increased passage of gas. Patient has had similar symptoms over the last approximately 10 years since a spinal surgery on his lumbar spine. He states that initially it was only \"once in a while\", but now is more frequent and more painful. He states that it feels like gas buildup in his rectum then explodes into his perineum. He denies any constipation. He is urinating frequently. He does not have any blood in the stool, but intermittently has black stools. No history of GI bleed. Per chart review, last time patient was seen for this he was being treated for prostatitis, and followed up with urology. Patient states that no doctor has been able to figure out the source of his pain, but he believes it is coming from nerves in his back and \"messes up his head\" when it gets too bad. REVIEW OF SYSTEMS     Review of Systems   Constitutional: Negative for chills and fever. HENT: Negative for congestion, rhinorrhea and sore throat. Eyes: Negative for pain and redness. Respiratory: Negative for cough and shortness of breath. Cardiovascular: Negative for chest pain and leg swelling. Gastrointestinal: Positive for abdominal pain and rectal pain. Negative for blood in stool, constipation, diarrhea, nausea and vomiting. Genitourinary: Positive for frequency. Negative for dysuria, penile pain, penile swelling, testicular pain and urgency. Musculoskeletal: Negative for back pain and joint swelling. Skin: Negative for rash. Neurological: Negative for dizziness and syncope.    All other systems reviewed fluticasone (FLONASE) 50 MCG/ACT nasal spray 1 spray by Nasal route daily, Disp-1 Bottle, R-3Normal      ibuprofen (ADVIL;MOTRIN) 800 MG tablet Take 1 tablet by mouth every 8 hours as needed for Pain, Disp-30 tablet, R-0      vitamin D (ERGOCALCIFEROL) 43197 UNITS CAPS capsule Take 1 capsule by mouth once a week, Disp-6 capsule, R-0      meloxicam (MOBIC) 7.5 MG tablet One daily for pain, Disp-30 tablet, R-3           ALLERGIES     is allergic to cyclobenzaprine. FAMILY HISTORY     has no family status information on file. SOCIAL HISTORY       Social History     Tobacco Use    Smoking status: Former Smoker    Smokeless tobacco: Never Used   Substance Use Topics    Alcohol use: No    Drug use: No     PHYSICAL EXAM     INITIAL VITALS: BP (!) 150/84   Pulse 76   Temp 97 °F (36.1 °C) (Oral)   Resp 18   Ht 5' 10\" (1.778 m)   Wt 211 lb (95.7 kg)   SpO2 99%   BMI 30.28 kg/m²    Physical Exam  Vitals and nursing note reviewed. Constitutional:       General: He is not in acute distress. Appearance: He is well-developed and normal weight. He is not diaphoretic. Comments: Extremely hard of hearing   HENT:      Head: Normocephalic and atraumatic. Nose: Nose normal.   Eyes:      Conjunctiva/sclera: Conjunctivae normal.      Pupils: Pupils are equal, round, and reactive to light. Cardiovascular:      Rate and Rhythm: Normal rate and regular rhythm. Heart sounds: Normal heart sounds. No murmur heard. No friction rub. No gallop. Pulmonary:      Effort: Pulmonary effort is normal. No respiratory distress. Breath sounds: Normal breath sounds. No wheezing. Abdominal:      General: Bowel sounds are normal. There is no distension. Palpations: Abdomen is soft. There is no shifting dullness or fluid wave. Tenderness: There is no abdominal tenderness. Hernia: No hernia is present. Genitourinary:     Prostate: Normal. Not enlarged and not tender.       Rectum: Normal. bedside ultrasound) are read by the radiologist, see reports below, unless otherwisenoted in MDM or here. CT ABDOMEN PELVIS W IV CONTRAST Additional Contrast? None   Final Result   1. A few scattered colonic diverticula. 2. No acute infective or inflammatory process. 3. No abnormal fluid collections. 4. No bowel obstruction. LABS: All lab results were reviewed by myself, and all abnormals are listed below.   Labs Reviewed   CBC WITH AUTO DIFFERENTIAL - Abnormal; Notable for the following components:       Result Value    Eosinophils % 5 (*)     All other components within normal limits   COMPREHENSIVE METABOLIC PANEL W/ REFLEX TO MG FOR LOW K - Abnormal; Notable for the following components:    Glucose 118 (*)     ALT 77 (*)     All other components within normal limits   URINALYSIS WITH MICROSCOPIC   LACTATE, SEPSIS     EMERGENCY DEPARTMENTCOURSE:   Vitals:    Vitals:    02/23/22 1032 02/23/22 1100 02/23/22 1128 02/23/22 1131   BP: 126/79 121/78  (!) 150/84   Pulse:       Resp:       Temp:       TempSrc:       SpO2: 98% 97% 97% 99%   Weight:       Height:           The patient was given the following medications while in the emergency department:  Orders Placed This Encounter   Medications    simethicone (MYLICON) chewable tablet 80 mg    dicyclomine (BENTYL) capsule 10 mg    morphine injection 4 mg    iopamidol (ISOVUE-370) 76 % injection 75 mL    albuterol sulfate  (90 Base) MCG/ACT inhaler 2 puff     Order Specific Question:   Initiate RT Bronchodilator Protocol     Answer:   No    DISCONTD: hydrocortisone (ANUSOL-HC) suppository 25 mg    dicyclomine (BENTYL) 10 MG capsule     Sig: Take 1 capsule by mouth every 6 hours as needed (cramps)     Dispense:  20 capsule     Refill:  0    hydrocortisone (ANUSOL-HC) 25 MG suppository     Sig: Place 1 suppository rectally 2 times daily     Dispense:  14 suppository     Refill:  0    simethicone (MYLICON) 057 MG chewable tablet     Sig: Take 1 tablet by mouth every 6 hours as needed for Flatulence     Dispense:  60 tablet     Refill:  0     CONSULTS:  None    FINAL IMPRESSION      1. Proctalgia fugax          DISPOSITION/PLAN   DISPOSITION Decision To Discharge 02/23/2022 11:42:31 AM      PATIENT REFERRED TO:  [address] [address]  [phone]  Schedule an appointment as soon as possible for a visit   To re-evaluate your symptoms with Gastroenterology - GI    DISCHARGE MEDICATIONS:  Discharge Medication List as of 2/23/2022 11:42 AM      START taking these medications    Details   hydrocortisone (ANUSOL-HC) 25 MG suppository Place 1 suppository rectally 2 times daily, Disp-14 suppository, R-0Print      simethicone (MYLICON) 866 MG chewable tablet Take 1 tablet by mouth every 6 hours as needed for Flatulence, Disp-60 tablet, R-0Print           Ra Keenan MD  Attending Emergency Physician  Xi3 voice recognition software used in portions of this document.                     Ra Keenan MD  02/23/22 8558

## 2022-10-26 ENCOUNTER — HOSPITAL ENCOUNTER (EMERGENCY)
Age: 72
Discharge: LEFT BEFORE DISCHARGE | End: 2022-10-26
Attending: EMERGENCY MEDICINE
Payer: MEDICARE

## 2022-10-26 VITALS
OXYGEN SATURATION: 99 % | TEMPERATURE: 97.6 F | DIASTOLIC BLOOD PRESSURE: 83 MMHG | HEART RATE: 87 BPM | RESPIRATION RATE: 16 BRPM | SYSTOLIC BLOOD PRESSURE: 151 MMHG

## 2022-10-26 DIAGNOSIS — K62.89 PROCTODYNIA: Primary | ICD-10-CM

## 2022-10-26 PROCEDURE — 99283 EMERGENCY DEPT VISIT LOW MDM: CPT

## 2022-10-26 PROCEDURE — 4500000002 HC ER NO CHARGE

## 2022-10-26 RX ORDER — GABAPENTIN 300 MG/1
300 CAPSULE ORAL ONCE
Status: DISCONTINUED | OUTPATIENT
Start: 2022-10-26 | End: 2022-10-26 | Stop reason: HOSPADM

## 2022-10-26 RX ORDER — DICYCLOMINE HYDROCHLORIDE 10 MG/ML
20 INJECTION INTRAMUSCULAR ONCE
Status: DISCONTINUED | OUTPATIENT
Start: 2022-10-26 | End: 2022-10-26 | Stop reason: HOSPADM

## 2022-10-26 ASSESSMENT — ENCOUNTER SYMPTOMS
FACIAL SWELLING: 0
EYE PAIN: 0
BACK PAIN: 0
EYE REDNESS: 0
CHEST TIGHTNESS: 0
NAUSEA: 0
ABDOMINAL PAIN: 0
VOMITING: 0
SHORTNESS OF BREATH: 0
COUGH: 0

## 2022-10-26 ASSESSMENT — PAIN DESCRIPTION - LOCATION: LOCATION: ABDOMEN

## 2022-10-26 ASSESSMENT — LIFESTYLE VARIABLES
HOW MANY STANDARD DRINKS CONTAINING ALCOHOL DO YOU HAVE ON A TYPICAL DAY: PATIENT DOES NOT DRINK
HOW OFTEN DO YOU HAVE A DRINK CONTAINING ALCOHOL: NEVER

## 2022-10-26 ASSESSMENT — PAIN - FUNCTIONAL ASSESSMENT: PAIN_FUNCTIONAL_ASSESSMENT: 0-10

## 2022-10-26 ASSESSMENT — PAIN SCALES - GENERAL: PAINLEVEL_OUTOF10: 8

## 2022-10-26 NOTE — ED PROVIDER NOTES
I performed a history and physical examination of the patient and discussed management with the resident. I reviewed the residents note and agree with the documented findings and plan of care. Any areas of disagreement are noted on the chart. I was personally present for the key portions of any procedures. I have documented in the chart those procedures where I was not present during the key portions. I have reviewed the emergency nurses triage note. I agree with the chief complaint, past medical history, past surgical history, allergies, medications, social and family history as documented unless otherwise noted below. Documentation of the HPI, Physical Exam and Medical Decision Making performed by medical students or scribes is based on my personal performance of the HPI, PE and MDM. For Phys Assistant/ Nurse Practitioner cases/documentation I have personally evaluated this patient and have completed at least one if not all key elements of the E/M (history, physical exam, and MDM). I find the patient's history and physical exam are consistent with the NP/PA documentation. I agree with the care provided, treatment rendered, disposition and followup plan. Additional findings are as noted. Vj Rea. Brown Hollis MD  Attending Emergency  Physician    This patient presented to the emergency department with complaints of rectal pain and \"air blowing out of my rectum\". Patient reports previous history of similar episodes past several years and reports this is the worst symptoms he has had during that time. Denies any abdominal pain. No diarrhea. No constipation. No fevers or chills. No chest pain. No difficulty breathing. No difficulty urinating. No frequency or urgency. He is voiding normally. No melena, hematochezia, hematemesis. No emesis. Normal oral intake solids and fluids. Patient relates the onset of symptoms to previous back surgery. Awake, alert, cooperative, responsive.  Speech fluent, normal comprehension. Lungs clear bilaterally. No rales, rhonchi, wheezes, stridor, retractions. Cardiac-S1S2, RRR, no murmur, rub, or gallop. Abdomen soft, nondistended, nontender. No organomegaly, mass, bruit. Normal bowel sounds. Mildly hypertympanic on percussion. Anal rectal exam was performed by Dillon Bills who reports no tenderness, swelling, mass, hemorrhoids. He reports soft Hemoccult negative stool in the vault. He reports normal prostate exam.  Impression: Recurrent proctodynia of undetermined etiology. Plan: We will provide IV fluids and dicyclomine as well as oral dose of gabapentin and reassess. I was advised by nursing staff that patient apparently eloped prior to completing evaluation and treatment.          Nuno Sharpe MD  10/26/22 Donell Law 1848 Doris Cordero MD  10/26/22 8205

## 2022-10-26 NOTE — ED TRIAGE NOTES
Pt presents to the ER with excessive air coming out of his rectum. Pt states he has severe pressure and pain from the rectum. Pt states the air from the rectum blows the sheets off of him. PT states the pain has been worsening and worsening since having a spinal surgery.

## 2022-10-26 NOTE — ED PROVIDER NOTES
[facility]  ED  Emergency Department Encounter  Emergency Medicine Resident     Pt William Parada  MRN: [id number]  [place] 1950  Date of evaluation: 10/26/22  PCP:  Berenice Roberts MD      [address] [place] [address]       Chief Complaint   Patient presents with    Abdominal Pain     Rectal pain and pressure coming from rectum       HISTORY OF PRESENT ILLNESS  (Location/Symptom, Timing/Onset, Context/Setting, Quality, Duration, Modifying Factors, Severity.)      Chad Estevez is a 67 y.o. male who presents with Bobbe Loredo coming from the rectum\". Patient states that this has been ongoing for years and has progressively gotten worse. He states \"it is continuous, it blows the sheets off me when I am in bed\". He states that ever since he had neck surgery in 2007 he has had this problem. Patient states that he has tried enemas and laxatives with no relief of symptoms. Denies any modifying factors. Patient rates his symptoms 10 out of 10. Patient denies any abdominal pain, nausea, vomiting, diarrhea, dysuria, burning with urination, hematuria, melena. Patient states that his bowels are moving normally and he is urinating normally. PAST MEDICAL / SURGICAL / SOCIAL / FAMILY HISTORY      has a past medical history of Arthritis and COPD (chronic obstructive pulmonary disease) ([address] has a past surgical history that includes Lumbar disc surgery and Circumcision.       Social History     Socioeconomic History    Marital status: Legally      Spouse name: Not on file    Number of children: Not on file    Years of education: Not on file    Highest education level: Not on file   Occupational History    Not on file   Tobacco Use    Smoking status: Former    Smokeless tobacco: Never   Substance and Sexual Activity    Alcohol use: No    Drug use: No    Sexual activity: Not on file   Other Topics Concern    Not on file   Social History Narrative    Not on file     Social Determinants of Health Financial Resource Strain: Not on file   Food Insecurity: Not on file   Transportation Needs: Not on file   Physical Activity: Not on file   Stress: Not on file   Social Connections: Not on file   Intimate Partner Violence: Not on file   Housing Stability: Not on file       History reviewed. No pertinent family history. Allergies:  Cyclobenzaprine    Home Medications:  Prior to Admission medications    Medication Sig Start Date End Date Taking?  Authorizing Provider   dicyclomine (BENTYL) 10 MG capsule Take 1 capsule by mouth every 6 hours as needed (cramps) 2/23/22   Trey Gilliland MD   hydrocortisone (ANUSOL-HC) 25 MG suppository Place 1 suppository rectally 2 times daily 2/23/22   Trey Gilliland MD   simethicone (MYLICON) 316 MG chewable tablet Take 1 tablet by mouth every 6 hours as needed for Flatulence 2/23/22   Trey Gilliland MD   acetaminophen (APAP EXTRA STRENGTH) 500 MG tablet Take 2 tablets by mouth every 6 hours as needed for Pain 2/29/20   Ollety Larve,    docusate sodium (COLACE) 100 MG capsule Take 1 capsule by mouth 2 times daily 2/29/20   Paige Sneed,    Cholecalciferol (VITAMIN D-400) 400 UNIT TABS tablet Take 1 tablet by mouth daily 6/22/17   Hilario Omalley MD   diphenhydrAMINE (BENADRYL) 25 MG capsule Take 1 capsule by mouth nightly as needed for Itching 6/22/17   Hilario Omalley MD   gabapentin (NEURONTIN) 300 MG capsule Take up top three times daily for nerve pain 6/22/17   Hilario Omalley MD   Multiple Vitamins-Minerals (THERAPEUTIC MULTIVITAMIN-MINERALS) tablet Take 1 tablet by mouth daily 6/22/17   Hilario Omalley MD   tamsulosin [facility]) 0.4 MG capsule Take 1 capsule by mouth daily 6/22/17   Hilario Omalley MD   VENTOLIN  (90 Base) MCG/ACT inhaler Inhale 1 puff into the lungs every 6 hours as needed for Wheezing 6/22/17   Hilario Omalley MD   COMBIVENT RESPIMAT  MCG/ACT AERS inhaler Inhale 1 puff into the lungs every 4 hours 6/22/17   Amina Peterson Pedro Santiago MD   naproxen (NAPROSYN) 500 MG tablet Take 1 tablet by mouth 2 times daily 6/22/17   Sal Lou MD   triamcinolone (KENALOG) 0.025 % LOTN lotion Apply topically 2 times daily 6/22/17   Sal Lou MD   Pramox-PE-Glycerin-Petrolatum (PREPARATION H) 1-0.25-14.4-15 % CREA rectal cream Place 1 each rectally 2 times daily 6/22/17   Sal Lou MD   fluticasone [facility]) 50 MCG/ACT nasal spray 1 spray by Nasal route daily 6/22/17   Sal Lou MD   ibuprofen (ADVIL;MOTRIN) 800 MG tablet Take 1 tablet by mouth every 8 hours as needed for Pain 1/8/17   Mercedez Light MD   vitamin D (ERGOCALCIFEROL) 12167 UNITS CAPS capsule Take 1 capsule by mouth once a week 11/15/16   Shaq Neville MD   meloxicam (MOBIC) 7.5 MG tablet One daily for pain 11/10/16   Kyler Hart MD       REVIEW OF SYSTEMS    (2-9 systems for level 4, 10 or more for level 5)      Review of Systems   Constitutional:  Negative for chills, diaphoresis and fatigue. HENT:  Negative for dental problem, facial swelling, nosebleeds and tinnitus. Eyes:  Negative for pain, redness and visual disturbance. Respiratory:  Negative for cough, chest tightness and shortness of breath. Cardiovascular:  Negative for chest pain, palpitations and leg swelling. Gastrointestinal:  Negative for abdominal pain, nausea and vomiting. Genitourinary:  Negative for flank pain, hematuria, penile pain, scrotal swelling and testicular pain. Musculoskeletal:  Negative for back pain, neck pain and neck stiffness. Skin:  Negative for pallor, rash and wound. Neurological:  Negative for syncope, facial asymmetry, weakness, numbness and headaches. PHYSICAL EXAM   (up to 7 for level 4, 8 or more for level 5)      INITIAL VITALS:   BP (!) 151/83   Pulse 87   Temp 97.6 °F (36.4 °C) (Oral)   Resp 16   SpO2 99%     Physical Exam  Constitutional:       Appearance: Normal appearance. He is not ill-appearing or diaphoretic. HENT:      Head: Normocephalic and atraumatic. Eyes:      Extraocular Movements: Extraocular movements intact. Pupils: Pupils are equal, round, and reactive to light. Cardiovascular:      Rate and Rhythm: Normal rate and regular rhythm. Pulses: Normal pulses. Heart sounds: Normal heart sounds. No murmur heard. No friction rub. No gallop. Pulmonary:      Effort: Pulmonary effort is normal.      Breath sounds: Normal breath sounds. No stridor. No rhonchi or rales. Abdominal:      General: Bowel sounds are normal. There is no distension. Palpations: Abdomen is soft. Tenderness: There is no abdominal tenderness. There is no guarding or rebound. Hernia: No hernia is present. Genitourinary:     Prostate: Normal.      Rectum: Normal. Guaiac result negative. No mass, tenderness, external hemorrhoid or internal hemorrhoid. Normal anal tone. Comments: Brown stool around the rectum. Brown stool on digital exam.  Musculoskeletal:         General: No swelling, tenderness, deformity or signs of injury. Normal range of motion. Cervical back: Normal range of motion and neck supple. No tenderness. Skin:     General: Skin is warm and dry. Neurological:      General: No focal deficit present. Mental Status: He is alert and oriented to person, place, and time. Cranial Nerves: No cranial nerve deficit. Sensory: No sensory deficit. Motor: No weakness. Psychiatric:         Mood and Affect: Mood normal.       DIFFERENTIAL  DIAGNOSIS     PLAN (LABS / IMAGING / EKG):  No orders of the defined types were placed in this encounter.       MEDICATIONS ORDERED:  Orders Placed This Encounter   Medications    DISCONTD: dicyclomine (BENTYL) injection 20 mg    DISCONTD: gabapentin (NEURONTIN) capsule 300 mg       DDX:   Bowel obstruction, large bowel obstruction, gastroenteritis    DIAGNOSTIC RESULTS / EMERGENCY DEPARTMENT COURSE / MDM   LAB RESULTS:  No results found for this visit on 10/26/22. IMPRESSION:   Patient is a 74-year-old male comes in for air coming out of his rectum continuously. States this is going on chronically for many years and has progressively gotten worse. Patient was seen in February for same symptoms and had full work-up which was negative. Tablet that we need to repeat work-up today. We will give patient Bentyl and gabapentin for her symptoms. We will reevaluate patient after medications have been administered. RADIOLOGY:  No orders to display         EKG      All EKG's are interpreted by the Emergency Department Physician who either signs or Co-signs this chart in the absence of a cardiologist.    [address] [place] [address]:      No notes of  Admission Criteria type on file. PROCEDURES:      CONSULTS:  None    CRITICAL CARE:      FINAL IMPRESSION      1. Proctodynia          DISPOSITION / PLAN     DISPOSITION Eloped - Left Before Treatment Complete 10/26/2022 11:54:07 AM      PATIENT REFERRED TO:  No follow-up provider specified.     DISCHARGE MEDICATIONS:  Discharge Medication List as of 10/26/2022 11:56 AM          Mario Alberto Holly DO  Emergency Medicine Resident    (Please note that portions of thisnote were completed with a voice recognition program.  Efforts were made to edit the dictations but occasionally words are mis-transcribed.)       Mario Alberto Holly DO  Resident  10/26/22 7367 Bernadette Queen DO  Resident  12/04/22 [id number]

## 2023-09-03 ENCOUNTER — HOSPITAL ENCOUNTER (EMERGENCY)
Age: 73
Discharge: HOME | End: 2023-09-03
Attending: EMERGENCY MEDICINE
Payer: MEDICARE

## 2023-09-03 VITALS
TEMPERATURE: 97.6 F | RESPIRATION RATE: 18 BRPM | WEIGHT: 222.22 LBS | DIASTOLIC BLOOD PRESSURE: 82 MMHG | BODY MASS INDEX: 31.89 KG/M2 | OXYGEN SATURATION: 95 % | HEART RATE: 92 BPM | SYSTOLIC BLOOD PRESSURE: 154 MMHG

## 2023-09-03 DIAGNOSIS — K62.89 PROCTALGIA: Primary | ICD-10-CM

## 2023-09-03 PROCEDURE — 6370000000 HC RX 637 (ALT 250 FOR IP): Performed by: STUDENT IN AN ORGANIZED HEALTH CARE EDUCATION/TRAINING PROGRAM

## 2023-09-03 PROCEDURE — 99283 EMERGENCY DEPT VISIT LOW MDM: CPT

## 2023-09-03 RX ORDER — HYDROCODONE BITARTRATE AND ACETAMINOPHEN 5; 325 MG/1; MG/1
1 TABLET ORAL EVERY 6 HOURS PRN
Qty: 12 TABLET | Refills: 0 | Status: SHIPPED | OUTPATIENT
Start: 2023-09-03 | End: 2023-09-06

## 2023-09-03 RX ORDER — MORPHINE SULFATE 4 MG/ML
4 INJECTION, SOLUTION INTRAMUSCULAR; INTRAVENOUS ONCE
Status: DISCONTINUED | OUTPATIENT
Start: 2023-09-03 | End: 2023-09-03

## 2023-09-03 RX ORDER — OXYCODONE HYDROCHLORIDE 5 MG/1
5 TABLET ORAL ONCE
Status: COMPLETED | OUTPATIENT
Start: 2023-09-03 | End: 2023-09-03

## 2023-09-03 RX ADMIN — OXYCODONE HYDROCHLORIDE 5 MG: 5 TABLET ORAL at 17:35

## 2023-09-03 ASSESSMENT — ENCOUNTER SYMPTOMS
VOMITING: 0
NAUSEA: 0
BLOOD IN STOOL: 0
SHORTNESS OF BREATH: 0
RECTAL PAIN: 1
ABDOMINAL PAIN: 1

## 2023-09-03 NOTE — DISCHARGE INSTRUCTIONS
You were seen due to rectal pain. We recommend that you continue to take your medications as prescribed at home including your gabapentin, Robaxin, and Percocet. Please take your stool softeners as prescribed. We advise that you follow-up with your primary care physician for this problem. Please return to the ED if you develop worsening abdominal pain, nausea, vomiting, fever, chills, rectal bleeding, blood in your stool, chest pain, shortness of breath or for any other concern.

## 2023-09-03 NOTE — ED TRIAGE NOTES
Pt arrived to ED with reports of stomach churning and rectal pain. Pt was recently seen at [facility] for similar issues. Upon arrival pt is visibly uncomfortable. .   VSS, RR equal and non labored, NAD, pt is alert and oriented x4    Call light within reach, pt changed into gown    Following plan of care

## 2023-09-03 NOTE — ED PROVIDER NOTES
[address] [place] [address] ED  Emergency Department Encounter  Emergency Medicine Resident     Pt Mandy Flores  MRN: [id number]  [address] [place] 1950  Date of evaluation: 9/3/23  PCP:  Yajaira Miner MD  Note Started: 5:44 PM EDT      CHIEF COMPLAINT       Chief Complaint   Patient presents with    Rectal Pain       HISTORY OF PRESENT ILLNESS  (Location/Symptom, Timing/Onset, Context/Setting, Quality, Duration, Modifying Factors, Severity.)      Cedric Ortega is a 75 y.o. male who presents with rectal pain. Patient reports that he has been having rectal pain for the past year and recently was admitted to [facility] after a stay at another hospital for evaluation for this problem. He reports that the surgeon performed a rectal exam that showed \"a hole in the rectum \"but they were unable to provide any surgical fix for patient's symptoms. Patient reports he is out of his Percocet that he was prescribed last week. He reports that he will not be able to see his primary care physician until Tuesday. He additionally reports that he is compliant with the other medications including Robaxin, gabapentin and stool softeners. He reports the pain is a constant churning feeling in his rectal region. He reports that sometimes feels like it is spread throughout his abdomen. He denies any dysuria but reports some incontinence of stool. He reports that this pain is constant and very bothersome to him. He denies any nausea, vomiting but reports that his appetite has been diminished recently. PAST MEDICAL / SURGICAL / SOCIAL / FAMILY HISTORY      has a past medical history of Arthritis and COPD (chronic obstructive pulmonary disease) ([address] has a past surgical history that includes Lumbar disc surgery and Circumcision.     Social History     Socioeconomic History    Marital status: Legally      Spouse name: Not on file    Number of children: Not on file    Years of education: Not on file

## 2023-09-05 ENCOUNTER — HOSPITAL ENCOUNTER (EMERGENCY)
Age: 73
Discharge: HOME | End: 2023-09-05
Attending: EMERGENCY MEDICINE
Payer: MEDICARE

## 2023-09-05 VITALS
TEMPERATURE: 97.1 F | RESPIRATION RATE: 15 BRPM | HEART RATE: 75 BPM | OXYGEN SATURATION: 98 % | WEIGHT: 226.19 LBS | BODY MASS INDEX: 31.67 KG/M2 | DIASTOLIC BLOOD PRESSURE: 99 MMHG | HEIGHT: 71 IN | SYSTOLIC BLOOD PRESSURE: 152 MMHG

## 2023-09-05 DIAGNOSIS — K62.89 RECTAL PAIN: Primary | ICD-10-CM

## 2023-09-05 PROCEDURE — 6360000002 HC RX W HCPCS

## 2023-09-05 PROCEDURE — 99284 EMERGENCY DEPT VISIT MOD MDM: CPT

## 2023-09-05 PROCEDURE — 96372 THER/PROPH/DIAG INJ SC/IM: CPT

## 2023-09-05 RX ORDER — KETOROLAC TROMETHAMINE 30 MG/ML
30 INJECTION, SOLUTION INTRAMUSCULAR; INTRAVENOUS ONCE
Status: COMPLETED | OUTPATIENT
Start: 2023-09-05 | End: 2023-09-05

## 2023-09-05 RX ADMIN — KETOROLAC TROMETHAMINE 30 MG: 30 INJECTION, SOLUTION INTRAMUSCULAR; INTRAVENOUS at 10:18

## 2023-09-05 ASSESSMENT — ENCOUNTER SYMPTOMS
ABDOMINAL DISTENTION: 0
RECTAL PAIN: 1
VOMITING: 0
NAUSEA: 0

## 2023-09-05 ASSESSMENT — PAIN DESCRIPTION - LOCATION
LOCATION: RECTUM
LOCATION: RECTUM

## 2023-09-05 ASSESSMENT — PAIN - FUNCTIONAL ASSESSMENT: PAIN_FUNCTIONAL_ASSESSMENT: 0-10

## 2023-09-05 ASSESSMENT — PAIN SCALES - GENERAL
PAINLEVEL_OUTOF10: 10
PAINLEVEL_OUTOF10: 10

## 2023-09-05 ASSESSMENT — PAIN DESCRIPTION - DESCRIPTORS: DESCRIPTORS: STABBING;SHARP

## 2023-09-05 NOTE — ED NOTES
Dr. [place]  in to assess patient  Patient states my rectum is blowing out  Denies diarrhea       Mary Doll RN  09/05/23 6470

## 2023-09-05 NOTE — DISCHARGE INSTRUCTIONS
You were seen in the emergency department for persistent rectal pain. On rectal exam, no hemorrhoids noted. Your vital signs are stable. You received IM Toradol for your pain. You have Percocet at home for your pain. Please take as prescribed. You need to follow-up with your colorectal surgeon as soon as possible for further management of your chronic rectal pain. Follow-up with your primary care provider in the next week given recent ER visit. Please return to the emergency department if you notice any bloody stools or develop any fevers, chills, nausea, or vomiting.

## 2023-09-05 NOTE — ED TRIAGE NOTES
Pt arrived to ED with reports of stomach churning and rectal pain. \"Last night I blew out 2 big black balls, right out of my rectum\", I'm in a whole bunch of pain     Upon arrival pt is visibly uncomfortable. .   VSS, RR equal and non labored, NAD, pt is alert and oriented x4

## 2023-09-07 ENCOUNTER — APPOINTMENT (OUTPATIENT)
Dept: MRI IMAGING | Age: 73
End: 2023-09-07
Payer: MEDICARE

## 2023-09-07 ENCOUNTER — HOSPITAL ENCOUNTER (OUTPATIENT)
Age: 73
Setting detail: OBSERVATION
Discharge: HOME | End: 2023-09-09
Attending: EMERGENCY MEDICINE | Admitting: EMERGENCY MEDICINE
Payer: MEDICARE

## 2023-09-07 DIAGNOSIS — K62.89 RECTAL PAIN: Primary | ICD-10-CM

## 2023-09-07 DIAGNOSIS — H91.93 BILATERAL HEARING LOSS, UNSPECIFIED HEARING LOSS TYPE: ICD-10-CM

## 2023-09-07 DIAGNOSIS — K62.89 PROCTALGIA: ICD-10-CM

## 2023-09-07 LAB
ANION GAP SERPL CALCULATED.3IONS-SCNC: 8 MMOL/L (ref 9–17)
BASOPHILS # BLD: 0.04 K/UL (ref 0–0.2)
BASOPHILS NFR BLD: 1 % (ref 0–2)
BILIRUB UR QL STRIP: NEGATIVE
BUN SERPL-MCNC: 18 MG/DL (ref 8–23)
CALCIUM SERPL-MCNC: 8 MG/DL (ref 8.6–10.4)
CHLORIDE SERPL-SCNC: 109 MMOL/L (ref 98–107)
CLARITY UR: CLEAR
CO2 SERPL-SCNC: 21 MMOL/L (ref 20–31)
COLOR UR: YELLOW
CREAT SERPL-MCNC: 1 MG/DL (ref 0.7–1.2)
EOSINOPHIL # BLD: 0.21 K/UL (ref 0–0.44)
EOSINOPHILS RELATIVE PERCENT: 4 % (ref 1–4)
EPI CELLS #/AREA URNS HPF: NORMAL /HPF (ref 0–5)
ERYTHROCYTE [DISTWIDTH] IN BLOOD BY AUTOMATED COUNT: 14 % (ref 11.8–14.4)
GFR SERPL CREATININE-BSD FRML MDRD: >60 ML/MIN/1.73M2
GLUCOSE SERPL-MCNC: 129 MG/DL (ref 70–99)
GLUCOSE UR STRIP-MCNC: NEGATIVE MG/DL
HCT VFR BLD AUTO: 38.5 % (ref 40.7–50.3)
HGB BLD-MCNC: 12.8 G/DL (ref 13–17)
HGB UR QL STRIP.AUTO: NEGATIVE
IMM GRANULOCYTES # BLD AUTO: 0.04 K/UL (ref 0–0.3)
IMM GRANULOCYTES NFR BLD: 1 %
KETONES UR STRIP-MCNC: NEGATIVE MG/DL
LEUKOCYTE ESTERASE UR QL STRIP: NEGATIVE
LYMPHOCYTES NFR BLD: 1.6 K/UL (ref 1.1–3.7)
LYMPHOCYTES RELATIVE PERCENT: 30 % (ref 24–43)
MCH RBC QN AUTO: 31.1 PG (ref 25.2–33.5)
MCHC RBC AUTO-ENTMCNC: 33.2 G/DL (ref 28.4–34.8)
MCV RBC AUTO: 93.7 FL (ref 82.6–102.9)
MONOCYTES NFR BLD: 0.39 K/UL (ref 0.1–1.2)
MONOCYTES NFR BLD: 7 % (ref 3–12)
NEUTROPHILS NFR BLD: 57 % (ref 36–65)
NEUTS SEG NFR BLD: 3.04 K/UL (ref 1.5–8.1)
NITRITE UR QL STRIP: NEGATIVE
NRBC BLD-RTO: 0 PER 100 WBC
PH UR STRIP: 6 [PH] (ref 5–8)
PLATELET # BLD AUTO: 187 K/UL (ref 138–453)
PMV BLD AUTO: 10.2 FL (ref 8.1–13.5)
POTASSIUM SERPL-SCNC: 3.9 MMOL/L (ref 3.7–5.3)
PROT UR STRIP-MCNC: NEGATIVE MG/DL
PSA SERPL-MCNC: 10.53 NG/ML
RBC # BLD AUTO: 4.11 M/UL (ref 4.21–5.77)
RBC #/AREA URNS HPF: NORMAL /HPF (ref 0–4)
SODIUM SERPL-SCNC: 138 MMOL/L (ref 135–144)
SP GR UR STRIP: 1.02 (ref 1–1.03)
UROBILINOGEN UR STRIP-ACNC: NORMAL EU/DL (ref 0–1)
WBC #/AREA URNS HPF: NORMAL /HPF (ref 0–5)
WBC OTHER # BLD: 5.3 K/UL (ref 3.5–11.3)

## 2023-09-07 PROCEDURE — G0103 PSA SCREENING: HCPCS

## 2023-09-07 PROCEDURE — 87491 CHLMYD TRACH DNA AMP PROBE: CPT

## 2023-09-07 PROCEDURE — 94761 N-INVAS EAR/PLS OXIMETRY MLT: CPT

## 2023-09-07 PROCEDURE — 87591 N.GONORRHOEAE DNA AMP PROB: CPT

## 2023-09-07 PROCEDURE — APPSS30 APP SPLIT SHARED TIME 16-30 MINUTES: Performed by: NURSE PRACTITIONER

## 2023-09-07 PROCEDURE — G0378 HOSPITAL OBSERVATION PER HR: HCPCS

## 2023-09-07 PROCEDURE — 6360000002 HC RX W HCPCS: Performed by: STUDENT IN AN ORGANIZED HEALTH CARE EDUCATION/TRAINING PROGRAM

## 2023-09-07 PROCEDURE — 87086 URINE CULTURE/COLONY COUNT: CPT

## 2023-09-07 PROCEDURE — 2500000003 HC RX 250 WO HCPCS: Performed by: STUDENT IN AN ORGANIZED HEALTH CARE EDUCATION/TRAINING PROGRAM

## 2023-09-07 PROCEDURE — 72148 MRI LUMBAR SPINE W/O DYE: CPT

## 2023-09-07 PROCEDURE — 2700000000 HC OXYGEN THERAPY PER DAY

## 2023-09-07 PROCEDURE — 2580000003 HC RX 258: Performed by: STUDENT IN AN ORGANIZED HEALTH CARE EDUCATION/TRAINING PROGRAM

## 2023-09-07 PROCEDURE — 72195 MRI PELVIS W/O DYE: CPT

## 2023-09-07 PROCEDURE — 99285 EMERGENCY DEPT VISIT HI MDM: CPT

## 2023-09-07 PROCEDURE — 80048 BASIC METABOLIC PNL TOTAL CA: CPT

## 2023-09-07 PROCEDURE — 85025 COMPLETE CBC W/AUTO DIFF WBC: CPT

## 2023-09-07 PROCEDURE — 87661 TRICHOMONAS VAGINALIS AMPLIF: CPT

## 2023-09-07 PROCEDURE — 96374 THER/PROPH/DIAG INJ IV PUSH: CPT

## 2023-09-07 PROCEDURE — 81001 URINALYSIS AUTO W/SCOPE: CPT

## 2023-09-07 PROCEDURE — 96372 THER/PROPH/DIAG INJ SC/IM: CPT

## 2023-09-07 PROCEDURE — 6370000000 HC RX 637 (ALT 250 FOR IP): Performed by: STUDENT IN AN ORGANIZED HEALTH CARE EDUCATION/TRAINING PROGRAM

## 2023-09-07 PROCEDURE — 96375 TX/PRO/DX INJ NEW DRUG ADDON: CPT

## 2023-09-07 RX ORDER — ACETAMINOPHEN 650 MG/1
650 SUPPOSITORY RECTAL EVERY 6 HOURS PRN
Status: DISCONTINUED | OUTPATIENT
Start: 2023-09-07 | End: 2023-09-09 | Stop reason: HOSPADM

## 2023-09-07 RX ORDER — SODIUM CHLORIDE 0.9 % (FLUSH) 0.9 %
5-40 SYRINGE (ML) INJECTION EVERY 12 HOURS SCHEDULED
Status: DISCONTINUED | OUTPATIENT
Start: 2023-09-07 | End: 2023-09-09 | Stop reason: HOSPADM

## 2023-09-07 RX ORDER — KETAMINE HCL IN NACL, ISO-OSM 100MG/10ML
0.3 SYRINGE (ML) INJECTION ONCE
Status: COMPLETED | OUTPATIENT
Start: 2023-09-07 | End: 2023-09-07

## 2023-09-07 RX ORDER — PRAMOXINE HYDROCHLORIDE 10 MG/G
AEROSOL, FOAM TOPICAL
Status: ACTIVE | OUTPATIENT
Start: 2023-09-07 | End: 2023-09-08

## 2023-09-07 RX ORDER — ENOXAPARIN SODIUM 100 MG/ML
30 INJECTION SUBCUTANEOUS 2 TIMES DAILY
Status: DISCONTINUED | OUTPATIENT
Start: 2023-09-07 | End: 2023-09-09 | Stop reason: HOSPADM

## 2023-09-07 RX ORDER — OXYCODONE HYDROCHLORIDE 5 MG/1
5 TABLET ORAL EVERY 8 HOURS PRN
Status: DISCONTINUED | OUTPATIENT
Start: 2023-09-07 | End: 2023-09-09 | Stop reason: HOSPADM

## 2023-09-07 RX ORDER — SODIUM CHLORIDE 0.9 % (FLUSH) 0.9 %
5-40 SYRINGE (ML) INJECTION PRN
Status: DISCONTINUED | OUTPATIENT
Start: 2023-09-07 | End: 2023-09-09 | Stop reason: HOSPADM

## 2023-09-07 RX ORDER — LORAZEPAM 2 MG/ML
1 INJECTION INTRAMUSCULAR ONCE
Status: COMPLETED | OUTPATIENT
Start: 2023-09-07 | End: 2023-09-07

## 2023-09-07 RX ORDER — FENTANYL CITRATE 50 UG/ML
50 INJECTION, SOLUTION INTRAMUSCULAR; INTRAVENOUS ONCE
Status: COMPLETED | OUTPATIENT
Start: 2023-09-07 | End: 2023-09-07

## 2023-09-07 RX ORDER — TAMSULOSIN HYDROCHLORIDE 0.4 MG/1
0.4 CAPSULE ORAL DAILY
Status: DISCONTINUED | OUTPATIENT
Start: 2023-09-07 | End: 2023-09-09 | Stop reason: HOSPADM

## 2023-09-07 RX ORDER — ALBUTEROL SULFATE 90 UG/1
1 AEROSOL, METERED RESPIRATORY (INHALATION) EVERY 4 HOURS PRN
Status: DISCONTINUED | OUTPATIENT
Start: 2023-09-07 | End: 2023-09-09 | Stop reason: HOSPADM

## 2023-09-07 RX ORDER — ONDANSETRON 4 MG/1
4 TABLET, ORALLY DISINTEGRATING ORAL EVERY 8 HOURS PRN
Status: DISCONTINUED | OUTPATIENT
Start: 2023-09-07 | End: 2023-09-09 | Stop reason: HOSPADM

## 2023-09-07 RX ORDER — ACETAMINOPHEN 325 MG/1
650 TABLET ORAL EVERY 6 HOURS PRN
Status: DISCONTINUED | OUTPATIENT
Start: 2023-09-07 | End: 2023-09-09 | Stop reason: HOSPADM

## 2023-09-07 RX ORDER — KETOROLAC TROMETHAMINE 30 MG/ML
30 INJECTION, SOLUTION INTRAMUSCULAR; INTRAVENOUS ONCE
Status: COMPLETED | OUTPATIENT
Start: 2023-09-07 | End: 2023-09-07

## 2023-09-07 RX ORDER — DOCUSATE SODIUM 100 MG/1
100 CAPSULE, LIQUID FILLED ORAL 2 TIMES DAILY
Status: DISCONTINUED | OUTPATIENT
Start: 2023-09-07 | End: 2023-09-09 | Stop reason: HOSPADM

## 2023-09-07 RX ORDER — PRAMOXINE HYDROCHLORIDE 10 MG/G
AEROSOL, FOAM TOPICAL 3 TIMES DAILY PRN
Status: DISCONTINUED | OUTPATIENT
Start: 2023-09-07 | End: 2023-09-09 | Stop reason: HOSPADM

## 2023-09-07 RX ORDER — ONDANSETRON 2 MG/ML
4 INJECTION INTRAMUSCULAR; INTRAVENOUS EVERY 6 HOURS PRN
Status: DISCONTINUED | OUTPATIENT
Start: 2023-09-07 | End: 2023-09-09 | Stop reason: HOSPADM

## 2023-09-07 RX ORDER — POLYETHYLENE GLYCOL 3350 17 G/17G
17 POWDER, FOR SOLUTION ORAL DAILY PRN
Status: DISCONTINUED | OUTPATIENT
Start: 2023-09-07 | End: 2023-09-09 | Stop reason: HOSPADM

## 2023-09-07 RX ORDER — SODIUM CHLORIDE 9 MG/ML
INJECTION, SOLUTION INTRAVENOUS PRN
Status: DISCONTINUED | OUTPATIENT
Start: 2023-09-07 | End: 2023-09-09 | Stop reason: HOSPADM

## 2023-09-07 RX ADMIN — ACETAMINOPHEN 650 MG: 325 TABLET ORAL at 19:35

## 2023-09-07 RX ADMIN — KETOROLAC TROMETHAMINE 30 MG: 30 INJECTION, SOLUTION INTRAMUSCULAR; INTRAVENOUS at 08:30

## 2023-09-07 RX ADMIN — ENOXAPARIN SODIUM 30 MG: 100 INJECTION SUBCUTANEOUS at 19:32

## 2023-09-07 RX ADMIN — SODIUM CHLORIDE, PRESERVATIVE FREE 10 ML: 5 INJECTION INTRAVENOUS at 19:38

## 2023-09-07 RX ADMIN — Medication 30.8 MG: at 11:17

## 2023-09-07 RX ADMIN — LORAZEPAM 1 MG: 2 INJECTION INTRAMUSCULAR; INTRAVENOUS at 14:19

## 2023-09-07 RX ADMIN — FENTANYL CITRATE 50 MCG: 50 INJECTION, SOLUTION INTRAMUSCULAR; INTRAVENOUS at 16:23

## 2023-09-07 RX ADMIN — DOCUSATE SODIUM 100 MG: 100 CAPSULE, LIQUID FILLED ORAL at 19:32

## 2023-09-07 RX ADMIN — OXYCODONE HYDROCHLORIDE 5 MG: 5 TABLET ORAL at 19:35

## 2023-09-07 RX ADMIN — POLYETHYLENE GLYCOL 3350 17 G: 17 POWDER, FOR SOLUTION ORAL at 19:38

## 2023-09-07 ASSESSMENT — ENCOUNTER SYMPTOMS
NAUSEA: 0
VOMITING: 0
ABDOMINAL PAIN: 0
RHINORRHEA: 0
DIARRHEA: 0
RECTAL PAIN: 1
SHORTNESS OF BREATH: 0
EYE REDNESS: 0

## 2023-09-07 ASSESSMENT — PAIN SCALES - GENERAL
PAINLEVEL_OUTOF10: 8
PAINLEVEL_OUTOF10: 10

## 2023-09-07 ASSESSMENT — PAIN DESCRIPTION - LOCATION: LOCATION: RECTUM

## 2023-09-07 ASSESSMENT — PAIN - FUNCTIONAL ASSESSMENT: PAIN_FUNCTIONAL_ASSESSMENT: 0-10

## 2023-09-07 NOTE — CONSULTS
Department of Neurosurgery                                            Nurse Practitioner Consult Note      Reason for Consult:  severe proctalgia   Requesting Physician:  Nava  Neurosurgeon:   [] Dr. Abbi Ybarra  [] Dr. Jose Guadalupe Powell  [] Dr. Adelina Spain  [x] Dr. Silva Lay      History Obtained From:  patient    CHIEF COMPLAINT:         Chief Complaint   Patient presents with    Rectal Pain     Rectal Pain       HISTORY OF PRESENT ILLNESS:       The patient is a 75 y.o. male who is  very hard of hearing who presents with severe rectal pain. He has been in multiple emergency departments over the past several months with complaints of severe rectal pain. This has been ongoing for 1 year but has gotten worse over past couple days though according to EHR he has complaints of severe rectal pain for more than a couple days. He reports he was previously diagnosed with chronic prostatitis and started on gabapentin, rectal steroid cream, mobic, robaxin, Norco. He had colonoscopy on 8/23/23 showing severe sigmoid diverticulosis, minimal external hemorrhoids. He also had recent rectal exam under anesthesia on 9/1 at [facility] showing minimal external hemorrhoids, RAHEEM reasonable sphincter tone, no masses no fissures and no other abnormalities     Patient has MRI Lumbar today showing Postsurgical changes from left hemilaminectomies at L4-L5 and L5-S1, Moderate spinal canal stenosis at L2-L3. No significant spinal canal stenosis at the remaining levels. Multilevel neural foraminal narrowing as above. Loss of disc space height with endplate irregularity as well as Modic type 2 degenerative endplate changes at B8-S2 and L5-S1     On 8/7/2023 he was started on 14 day course of Doxycycline  He has been in and out of hospitals since 6/13/2023 per EHR with complaints of this rectal pain  He has had numerous scan showing enlarged prostate since 6/13/2023 on CT abdomen pelvis   PAST MEDICAL HISTORY :       Past Medical History: 1 capsule by mouth 2 times daily 2/29/20   Lopez Fat, DO   Cholecalciferol (VITAMIN D-400) 400 UNIT TABS tablet Take 1 tablet by mouth daily 6/22/17   Lars Guerra MD   diphenhydrAMINE (BENADRYL) 25 MG capsule Take 1 capsule by mouth nightly as needed for Itching 6/22/17   Lars Guerra MD   gabapentin (NEURONTIN) 300 MG capsule Take up top three times daily for nerve pain 6/22/17   Lars Guerra MD   Multiple Vitamins-Minerals (THERAPEUTIC MULTIVITAMIN-MINERALS) tablet Take 1 tablet by mouth daily 6/22/17   Lars Guerra MD   tamsulosin [facility]) 0.4 MG capsule Take 1 capsule by mouth daily 6/22/17   Lars Guerra MD   VENTOLIN  (90 Base) MCG/ACT inhaler Inhale 1 puff into the lungs every 6 hours as needed for Wheezing 6/22/17   Lars Guerra MD   COMBIVENT RESPIMAT  MCG/ACT AERS inhaler Inhale 1 puff into the lungs every 4 hours 6/22/17   Lars Guerra MD   naproxen (NAPROSYN) 500 MG tablet Take 1 tablet by mouth 2 times daily 6/22/17   Lars Guerra MD   triamcinolone (KENALOG) 0.025 % LOTN lotion Apply topically 2 times daily 6/22/17   Lars Guerra MD   Pramox-PE-Glycerin-Petrolatum (PREPARATION H) 1-0.25-14.4-15 % CREA rectal cream Place 1 each rectally 2 times daily 6/22/17   Lars Guerra MD   fluticasone [place]) 50 MCG/ACT nasal spray 1 spray by Nasal route daily 6/22/17   Lars Guerra MD   ibuprofen (ADVIL;MOTRIN) 800 MG tablet Take 1 tablet by mouth every 8 hours as needed for Pain 1/8/17   [place] Samuel Light MD   vitamin D (ERGOCALCIFEROL) 73916 UNITS CAPS capsule Take 1 capsule by mouth once a week 11/15/16   Nikki Caballero MD   meloxicam (MOBIC) 7.5 MG tablet One daily for pain 11/10/16   Yovany Valentine MD       Current Medications:   Current Facility-Administered Medications: pramoxine HCl 1 % foam, , PeriANAL, Once PRN    REVIEW OF SYSTEMS:       CONSTITUTIONAL: negative for fatigue and malaise   EYES: negative for double

## 2023-09-07 NOTE — ED NOTES
Report called OBS and spoke with Nancy Gaviria RN to inform them that pt is coming to the floor.      Ronal Harris RN  09/07/23 7670

## 2023-09-07 NOTE — ED PROVIDER NOTES
[facility] ED  Emergency Department Encounter  Emergency Medicine Resident     Pt Lissette Ramos  MRN: [id number]  [address] [facility] [place] 1950  Date of evaluation: 9/7/23  PCP:  Aris Chase MD  Note Started: 8:00 AM EDT    CHIEF COMPLAINT       Chief Complaint   Patient presents with    Rectal Pain     Rectal Pain     HISTORY OF PRESENT ILLNESS  (Location/Symptom, Timing/Onset, Context/Setting, Quality, Duration, Modifying Factors, Severity.)      Flavia Pope is a 75 y.o. male with hx of BPH, degenerative disk who presents with rectal pain/pressure which feels like \"it's trying to fall out. \" Describes burning pain with passing gas. Patient has extensive hx with this pain and has been evaluated recently by general surgery and GI. Did have exam under anesthesia on 9/1/23 which showed minimal external hemorrhoids, without other acute findings. He was also recently admitted to [facility] in which he had CTabdomen and pelvis that just showed severe diverticular disease and enlarged prostate. Colonoscopy on 08/24/2023 demonstrated, no masses or polyps, minimal external hemorrhoids. Does have history of back surgery. Hx of degenerative disc disease of L4/5 and L5/s1. He states that he has \"tried everything\" without improvement. Takes gabapentin, norco, robaxin, and senna. He has also tried anusol without improvement. Patient is significantly hard of hearing making him a difficult historian. He repeatedly just states that the pain is \"ripping him in half\" despite repeated attempts to ask other questions. PAST MEDICAL / SURGICAL / SOCIAL / FAMILY HISTORY      has a past medical history of Arthritis and COPD (chronic obstructive pulmonary disease) ([address] has a past surgical history that includes Lumbar disc surgery and Circumcision.     Social History     Socioeconomic History    Marital status: Legally      Spouse name: Not on file    Number of children: Not on file    Years of admission for pain control if unable to control pain symptoms. Amount and/or Complexity of Data Reviewed  Labs: ordered. Radiology: ordered. Risk  OTC drugs. Prescription drug management. Decision regarding hospitalization. EKG  none    All EKG's are interpreted by the Emergency Department Physician who either signs or Co-signs this chart in the absence of a cardiologist.    EMERGENCY DEPARTMENT COURSE:    ED Course as of 09/07/23 1726   Thu Sep 07, 2023   0901 speaking with pharmacist do not have Proctofoam [CP]   1054 Discussed obtaining proctofoam as outpatient. Patient states he does not think he can fill this prescription and deal with his pain as outpatient stating \"I am at your mercy. \" Patient has not yet had trial of subsedating ketamine for pain. Patient agreeable to ketamine. Will trial in the ED. Discussion was had after patient returned from restroom. He ambulated steady and without aid. [CP]   7115 Lab work without acute change [CP]   [phone] [id number] Patient continues to have pain despite medications. Delay in administration of rectal foam due to acuity of ED. Will admit to observation service for NS evaluation, intractable pain, and pain medicine to evaluate.  [CP]   [phone] with NS NP. Will review imaging with attending but symptoms unlikely related. Will add on urine. Some charts note history of prostatitis however this seems to be only patient reported and patient is unreliable historian. Will obtain urine to further screen for sign of infection.  [CP]      ED Course User Index  [CP] Darryl Blair MD     PROCEDURES:  none    CONSULTS:  IP CONSULT TO NEUROSURGERY  IP CONSULT TO PAIN MANAGEMENT    CRITICAL CARE:  There was significant risk of life threatening deterioration of patient's condition requiring my direct management. Critical care time 0 minutes, excluding any documented procedures. FINAL IMPRESSION      1. Rectal pain    2.  Bilateral hearing loss, unspecified hearing loss

## 2023-09-07 NOTE — ED NOTES
Pt presents to the ED with c/o rectal pain. Pt states \"My bowels are coming out. They're hot and steamy. The pressure from my bowels almost blew out my toilet\". Pt states he is in a lot of pain, rates 8/10. Pt was seen here on the 9/5 for same complaint. Pt a/o x4, ambulatory to ED room 22. Pt resting on stretcher. ED staff at bedside.       Scarlet Martins RN  09/07/23 2115

## 2023-09-08 ENCOUNTER — APPOINTMENT (OUTPATIENT)
Dept: MRI IMAGING | Age: 73
End: 2023-09-08
Payer: MEDICARE

## 2023-09-08 LAB
CA-I BLD-SCNC: 1.31 MMOL/L (ref 1.13–1.33)
CHLAMYDIA DNA UR QL NAA+PROBE: NEGATIVE
MAGNESIUM SERPL-MCNC: 2.2 MG/DL (ref 1.6–2.6)
MICROORGANISM SPEC CULT: NORMAL
N GONORRHOEA DNA UR QL NAA+PROBE: NEGATIVE
PHOSPHATE SERPL-MCNC: 2.8 MG/DL (ref 2.5–4.5)
SOURCE: ABNORMAL
SPECIMEN DESCRIPTION: NORMAL
SPECIMEN DESCRIPTION: NORMAL
TRICHOMONAS VAGINALI, MOLECULAR: POSITIVE

## 2023-09-08 PROCEDURE — 82330 ASSAY OF CALCIUM: CPT

## 2023-09-08 PROCEDURE — 6360000002 HC RX W HCPCS: Performed by: PHYSICIAN ASSISTANT

## 2023-09-08 PROCEDURE — G0378 HOSPITAL OBSERVATION PER HR: HCPCS

## 2023-09-08 PROCEDURE — 96375 TX/PRO/DX INJ NEW DRUG ADDON: CPT

## 2023-09-08 PROCEDURE — 2580000003 HC RX 258: Performed by: STUDENT IN AN ORGANIZED HEALTH CARE EDUCATION/TRAINING PROGRAM

## 2023-09-08 PROCEDURE — 6370000000 HC RX 637 (ALT 250 FOR IP): Performed by: STUDENT IN AN ORGANIZED HEALTH CARE EDUCATION/TRAINING PROGRAM

## 2023-09-08 PROCEDURE — 6360000002 HC RX W HCPCS: Performed by: STUDENT IN AN ORGANIZED HEALTH CARE EDUCATION/TRAINING PROGRAM

## 2023-09-08 PROCEDURE — 72146 MRI CHEST SPINE W/O DYE: CPT

## 2023-09-08 PROCEDURE — 96372 THER/PROPH/DIAG INJ SC/IM: CPT

## 2023-09-08 PROCEDURE — 83735 ASSAY OF MAGNESIUM: CPT

## 2023-09-08 PROCEDURE — 84100 ASSAY OF PHOSPHORUS: CPT

## 2023-09-08 PROCEDURE — 96376 TX/PRO/DX INJ SAME DRUG ADON: CPT

## 2023-09-08 PROCEDURE — 72141 MRI NECK SPINE W/O DYE: CPT

## 2023-09-08 PROCEDURE — APPSS30 APP SPLIT SHARED TIME 16-30 MINUTES: Performed by: PHYSICIAN ASSISTANT

## 2023-09-08 PROCEDURE — 36415 COLL VENOUS BLD VENIPUNCTURE: CPT

## 2023-09-08 RX ORDER — ONDANSETRON 2 MG/ML
4 INJECTION INTRAMUSCULAR; INTRAVENOUS ONCE
Status: COMPLETED | OUTPATIENT
Start: 2023-09-08 | End: 2023-09-08

## 2023-09-08 RX ORDER — LORAZEPAM 2 MG/ML
1 INJECTION INTRAMUSCULAR ONCE
Status: COMPLETED | OUTPATIENT
Start: 2023-09-08 | End: 2023-09-08

## 2023-09-08 RX ORDER — METRONIDAZOLE 500 MG/1
2000 TABLET ORAL ONCE
Status: COMPLETED | OUTPATIENT
Start: 2023-09-08 | End: 2023-09-08

## 2023-09-08 RX ADMIN — SODIUM CHLORIDE, PRESERVATIVE FREE 10 ML: 5 INJECTION INTRAVENOUS at 19:32

## 2023-09-08 RX ADMIN — METRONIDAZOLE 2000 MG: 500 TABLET ORAL at 14:08

## 2023-09-08 RX ADMIN — TAMSULOSIN HYDROCHLORIDE 0.4 MG: 0.4 CAPSULE ORAL at 13:19

## 2023-09-08 RX ADMIN — ONDANSETRON 4 MG: 2 INJECTION INTRAMUSCULAR; INTRAVENOUS at 14:03

## 2023-09-08 RX ADMIN — Medication 1 MG: at 14:46

## 2023-09-08 RX ADMIN — DOCUSATE SODIUM 100 MG: 100 CAPSULE, LIQUID FILLED ORAL at 19:32

## 2023-09-08 RX ADMIN — ENOXAPARIN SODIUM 30 MG: 100 INJECTION SUBCUTANEOUS at 19:32

## 2023-09-08 RX ADMIN — OXYCODONE HYDROCHLORIDE 5 MG: 5 TABLET ORAL at 14:46

## 2023-09-08 RX ADMIN — POLYETHYLENE GLYCOL 3350 17 G: 17 POWDER, FOR SOLUTION ORAL at 19:37

## 2023-09-08 ASSESSMENT — PAIN SCALES - GENERAL: PAINLEVEL_OUTOF10: 8

## 2023-09-08 NOTE — PROGRESS NOTES
[address]  CDU / OBSERVATION ENCOUNTER  ATTENDING NOTE       I performed a history and physical examination of the patient and discussed management with the resident or midlevel provider. I reviewed the resident or midlevel provider's note and agree with the documented findings and plan of care. Any areas of disagreement are noted on the chart. I was personally present for the key portions of any procedures. I have documented in the chart those procedures where I was not present during the key portions. I have reviewed the nurses notes. I agree with the chief complaint, past medical history, past surgical history, allergies, medications, social and family history as documented unless otherwise noted below. The Family history, social history, and ROS are effectively unchanged since admission unless noted elsewhere in the chart. This patient was placed in the observation unit for reevaluation for possible admission to the hospital    [place] for severe rectal pain. This has been a chronic issue for him over the past several months. Patient has had significant work-up for this pain which has been unremarkable. Patient did have an MRI of the lumbar spine done yesterday which showed moderate spinal canal stenosis at L2-3 there is loss of disc space height and endplate irregularity as well as Modic type II degenerative endplate changes at X1-8 L5 and L5-S1. In the emergency department because of these findings. On my exam this morning, patient continues to complain of severe rectal pain. He says that no medications that have been tried have helped including the subsided of dose of ketamine he received yesterday in the emergency department. Patient is currently getting Roxicodone every 8 hours as well as pramoxine foam 3 times daily. Patient denies any other complaints. We will appreciate neurosurgery recommendations.   Pain management has also been consulted and will appreciate their Patient's brief changed.

## 2023-09-08 NOTE — CARE COORDINATION
[address] Flow/Interdisciplinary Rounds Progress Note    Quality Flow Rounds held on September 8, 2023 at [address] Attending:  Bedside Nurse, , and Nursing Unit Leadership    Barriers to Discharge: medical clearance    Anticipated Discharge Date:   09/09/23    Anticipated Discharge Disposition:home    Readmission Risk              Risk of Unplanned Readmission:  0           Discussed patient goal for the day, patient clinical progression, and barriers to discharge.   The following Goal(s) of the Day/Commitment(s) have been identified:  Activity Progression  met with pt to discuss transitional planning, declined needs other than possible transportation home      [facility], RN  September 8, 2023     1150am attempted to provide pt with jarvis, pt is laying on his stomach with bare bottom in air complaining of pain, not approp at this time will need to attempt later

## 2023-09-08 NOTE — H&P
[address]  CDU / [address] NOTE     Pt Name: Winifred Maldonado  MRN: [id number]  [address] [facility] 1950  Date of evaluation: 9/8/23  Patient's PCP is :  Gina Christina MD    CHIEF COMPLAINT       Chief Complaint   Patient presents with    Rectal Pain     Rectal Pain         HISTORY OF PRESENT ILLNESS    Winifred Maldonado is a 75 y.o. male who presents  with hx of BPH, degenerative disk who presents with rectal pain/pressure which feels like \"it's trying to fall out. \" Describes burning pain with passing gas. Patient has extensive hx with this pain and has been evaluated recently by general surgery and GI. Did have exam under anesthesia on 9/1/23 which showed minimal external hemorrhoids, without other acute findings. He was also recently admitted to [facility] in which he had CTabdomen and pelvis that just showed severe diverticular disease and enlarged prostate. Colonoscopy on 08/24/2023 demonstrated, no masses or polyps, minimal external hemorrhoids. Does have history of back surgery. Hx of degenerative disc disease of L4/5 and L5/s1. He states that he has \"tried everything\" without improvement. Takes gabapentin, norco, robaxin, and senna. He has also tried anusol without improvement. Patient is significantly hard of hearing making him a difficult historian. He repeatedly just states that the pain is \"ripping him in half\" despite repeated attempts to ask other questions. History was obtained in part through review of the ED chart.  When possible, a direct discussion was had with ED nurses, residents, and attendings  REVIEW OF SYSTEMS       General ROS - No fevers, No malaise   Ophthalmic ROS - No discharge, No changes in vision  ENT ROS -  No sore throat, No rhinorrhea,   Respiratory ROS - no shortness of breath, no cough, no  wheezing  Cardiovascular ROS - No chest pain, no dyspnea on exertion  Gastrointestinal ROS - No abdominal pain, no nausea or vomiting, no change in chronic bladder outlet obstruction. LABS:  I have reviewed and interpreted all available lab results. Labs Reviewed   CBC WITH AUTO DIFFERENTIAL - Abnormal; Notable for the following components:       Result Value    RBC 4.11 (*)     Hemoglobin 12.8 (*)     Hematocrit 38.5 (*)     Immature Granulocytes 1 (*)     All other components within normal limits   BASIC METABOLIC PANEL - Abnormal; Notable for the following components:    Chloride 109 (*)     Anion Gap 8 (*)     Glucose 129 (*)     Calcium 8.0 (*)     All other components within normal limits   PSA SCREENING - Abnormal; Notable for the following components:    PSA 10.53 (*)     All other components within normal limits   CULTURE, URINE   C.TRACHOMATIS N.GONORRHOEAE DNA, URINE   TRICHOMONAS VAGINALI, MOLECULAR   URINALYSIS WITH MICROSCOPIC         CDU IMPRESSION / PLAN      Doreen Lerner is a 75 y.o. male who presents with proctalgia. Appreciate neurosurgery recommendations  Labs ordered  Appreciate pain management recommendations  Continue home medications and pain control  Monitor vitals, labs, and imaging  DISPO: pending consults and clinical improvement    CONSULTS:    IP CONSULT TO NEUROSURGERY  IP CONSULT TO PAIN MANAGEMENT    PROCEDURES:  Not indicated       PATIENT REFERRED TO:    Emy Gaviria MD  [address] , [facility] [address]  [facility] [address] [phone]    Schedule an appointment as soon as possible for a visit   re-evaluation      --  Julia Lu MD   Emergency Medicine resident    This dictation was generated by voice recognition computer software. Although all attempts are made to edit the dictation for accuracy, there may be errors in the transcription that are not intended.

## 2023-09-09 VITALS
HEART RATE: 68 BPM | TEMPERATURE: 97.3 F | OXYGEN SATURATION: 98 % | BODY MASS INDEX: 32.26 KG/M2 | DIASTOLIC BLOOD PRESSURE: 77 MMHG | WEIGHT: 225.31 LBS | RESPIRATION RATE: 18 BRPM | HEIGHT: 70 IN | SYSTOLIC BLOOD PRESSURE: 136 MMHG

## 2023-09-09 PROCEDURE — 6360000002 HC RX W HCPCS: Performed by: STUDENT IN AN ORGANIZED HEALTH CARE EDUCATION/TRAINING PROGRAM

## 2023-09-09 PROCEDURE — G0378 HOSPITAL OBSERVATION PER HR: HCPCS

## 2023-09-09 PROCEDURE — 6370000000 HC RX 637 (ALT 250 FOR IP): Performed by: STUDENT IN AN ORGANIZED HEALTH CARE EDUCATION/TRAINING PROGRAM

## 2023-09-09 PROCEDURE — 2580000003 HC RX 258: Performed by: STUDENT IN AN ORGANIZED HEALTH CARE EDUCATION/TRAINING PROGRAM

## 2023-09-09 PROCEDURE — 96372 THER/PROPH/DIAG INJ SC/IM: CPT

## 2023-09-09 PROCEDURE — 94760 N-INVAS EAR/PLS OXIMETRY 1: CPT

## 2023-09-09 PROCEDURE — 6370000000 HC RX 637 (ALT 250 FOR IP)

## 2023-09-09 RX ORDER — METHOCARBAMOL 750 MG/1
750 TABLET, FILM COATED ORAL 4 TIMES DAILY
Status: DISCONTINUED | OUTPATIENT
Start: 2023-09-09 | End: 2023-09-09 | Stop reason: HOSPADM

## 2023-09-09 RX ORDER — OXYCODONE HYDROCHLORIDE 5 MG/1
5 TABLET ORAL EVERY 8 HOURS PRN
Qty: 12 TABLET | Refills: 0 | Status: SHIPPED | OUTPATIENT
Start: 2023-09-09 | End: 2023-09-13

## 2023-09-09 RX ADMIN — TAMSULOSIN HYDROCHLORIDE 0.4 MG: 0.4 CAPSULE ORAL at 09:49

## 2023-09-09 RX ADMIN — METHOCARBAMOL 750 MG: 750 TABLET ORAL at 12:21

## 2023-09-09 RX ADMIN — ENOXAPARIN SODIUM 30 MG: 100 INJECTION SUBCUTANEOUS at 12:21

## 2023-09-09 RX ADMIN — OXYCODONE HYDROCHLORIDE 5 MG: 5 TABLET ORAL at 09:49

## 2023-09-09 RX ADMIN — ACETAMINOPHEN 650 MG: 325 TABLET ORAL at 06:57

## 2023-09-09 RX ADMIN — OXYCODONE HYDROCHLORIDE 5 MG: 5 TABLET ORAL at 01:13

## 2023-09-09 RX ADMIN — SODIUM CHLORIDE, PRESERVATIVE FREE 10 ML: 5 INJECTION INTRAVENOUS at 09:50

## 2023-09-09 RX ADMIN — ACETAMINOPHEN 650 MG: 325 TABLET ORAL at 01:13

## 2023-09-09 RX ADMIN — DOCUSATE SODIUM 100 MG: 100 CAPSULE, LIQUID FILLED ORAL at 09:49

## 2023-09-09 ASSESSMENT — PAIN SCALES - GENERAL
PAINLEVEL_OUTOF10: 8
PAINLEVEL_OUTOF10: 10
PAINLEVEL_OUTOF10: 10

## 2023-09-09 NOTE — PLAN OF CARE
Pt remains free from injury throughout stay. Pt relates continuing moderate to severe rectal pain, but is agreeable with discharge plan. Printed discharge instructions given and explained to pt. Pt relates understanding and cooperation.
the  administration of intravenous contrast.     COMPARISON:  None     HISTORY:  ORDERING SYSTEM PROVIDED HISTORY: tremor BLE  TECHNOLOGIST PROVIDED HISTORY: Tremor BLE  What is the sedation requirement?->Sedation  Reason for Exam: tremor BLE     FINDINGS:  The examination is motion degraded. BONES/ALIGNMENT: There is normal alignment of the spine. The vertebral body  heights are maintained. The bone marrow signal appears unremarkable. SPINAL CORD: No abnormal cord signal is seen. SOFT TISSUES: No paraspinal mass identified. DEGENERATIVE CHANGES: Mild degenerative disc disease throughout the thoracic  spine without severe canal stenosis.      IMPRESSION:  No acute thoracic spine abnormality on motion degraded exam.    - MRI cervical and thoracic reviewed with Dr Gemma Green, no NSG intervention needed   - Stable for dc from [facility] standpoint, no set follow up needed, PRN

## 2023-09-09 NOTE — DISCHARGE SUMMARY
Anusol-HC  Place 1 suppository rectally 2 times daily     ibuprofen 800 MG tablet  Commonly known as: ADVIL;MOTRIN  Take 1 tablet by mouth every 8 hours as needed for Pain     meloxicam 7.5 MG tablet  Commonly known as: Mobic  One daily for pain     naproxen 500 MG tablet  Commonly known as: NAPROSYN  Take 1 tablet by mouth 2 times daily     Pramox-PE-Glycerin-Petrolatum 1-0.25-14.4-15 % Crea rectal cream  Commonly known as: Preparation H  Place 1 each rectally 2 times daily     simethicone 125 MG chewable tablet  Commonly known as: MYLICON  Take 1 tablet by mouth every 6 hours as needed for Flatulence     tamsulosin 0.4 MG capsule  Commonly known as: Flomax  Take 1 capsule by mouth daily     therapeutic multivitamin-minerals tablet  Take 1 tablet by mouth daily     triamcinolone 0.025 % Lotn lotion  Commonly known as: KENALOG  Apply topically 2 times daily     Ventolin  (90 Base) MCG/ACT inhaler  Generic drug: albuterol sulfate HFA  Inhale 1 puff into the lungs every 6 hours as needed for Wheezing     vitamin D 1.25 MG (10386 UT) Caps capsule  Commonly known as: ERGOCALCIFEROL  Take 1 capsule by mouth once a week               Where to Get Your Medications        You can get these medications from any pharmacy    Bring a paper prescription for each of these medications  oxyCODONE 5 MG immediate release tablet         Diet:  ADULT DIET; Regular, advance as tolerated     Activity:  As tolerated    Consultants: IP CONSULT TO NEUROSURGERY  IP CONSULT TO UROLOGY    Procedures:  Not indicated     Diagnostic Test:   Results for orders placed or performed during the hospital encounter of 09/07/23   Culture, Urine    Specimen: Urine, clean catch   Result Value Ref Range    Specimen Description . CLEAN CATCH URINE     Culture NO SIGNIFICANT GROWTH    C.trachomatis N.gonorrhoeae DNA, Urine    Specimen: Urine   Result Value Ref Range    Specimen Description . URINE     C. trachomatis DNA ,Urine NEGATIVE NEGATIVE    N. 9/7/2023  7:59 AM with complaints of rectal pain and pressure ongoing fr several days. At that time it was determined that He required further observation and neurosurgery evaluation. He was found to hve an elevated PSA and enlarged prostate. Labs and imaging were followed daily. Imaging results as above. He is medically stable to be discharged. Patient given referral information for Urology follow. Patient educated to follow up with PCP in 1 week and to return to the emergency department with any worsening symptoms. Disposition: Home    Patient stated that they will not drive themselves home from the hospital if they have gotten pain killers/ narcotics earlier that day and that they will arrange for transportation on their own or work with the  for a ride. Patient counseled NOT to drive while under the influence of narcotics/ pain killers. Condition: Good    Patient stable and ready for discharge home. I have discussed plan of care with patient and they are in understanding. They were instructed to read discharge paperwork. All of their questions and concerns were addressed. Time Spent: 0 day      --  My Supervising Physician for today is  Dr. Amparo Bolivar  We discussed and agreed upon a treatment plan   Efraín Wayne, [address] [place] [address]  Emergency Medicine Attending Physician    This dictation was generated by voice recognition computer software. Although all attempts are made to edit the dictation for accuracy, there may be errors in the transcription that are not intended.

## 2023-09-09 NOTE — PROGRESS NOTES
OBS/CDU   RESIDENT NOTE      Patients PCP is:  Delbert Latif MD        SUBJECTIVE      No acute events overnight. Was noted to be sitting comfortably on bed watching TV until writer entry observed. Has been able to tolerate a full diet without nausea or vomiting. The patient is urinating on his own and is passing flatus. Denies fever, chills, nausea, vomiting, chest pain, shortness of breath, abdominal pain, focal weakness, numbness, tingling, urinary/bowel symptoms, vision changes, visual hallucinations, or headache. PHYSICAL EXAM      General: NAD, AO X 3  Heent: EMOI, PERRL  Neck: SUPPLE, NO JVD  Cardiovascular: RRR, S1S2  Pulmonary: CTAB, NO SOB  Abdomen: SOFT, NTTP, ND, +BS  Extremities: +2/4 PULSES DISTAL, NO SWELLING  : tender RAHEEM anteriorly  Neuro / Psych: NO NUMBNESS OR TINGLING, MENTATION AT BASELINE    PERTINENT TEST /EXAMS      I have reviewed all available laboratory results. MEDICATIONS CURRENT   docusate sodium (COLACE) capsule 100 mg, BID  tamsulosin (FLOMAX) capsule 0.4 mg, Daily  pramoxine HCl 1 % foam, TID PRN  sodium chloride flush 0.9 % injection 5-40 mL, 2 times per day  sodium chloride flush 0.9 % injection 5-40 mL, PRN  0.9 % sodium chloride infusion, PRN  enoxaparin Sodium (LOVENOX) injection 30 mg, BID  ondansetron (ZOFRAN-ODT) disintegrating tablet 4 mg, Q8H PRN   Or  ondansetron (ZOFRAN) injection 4 mg, Q6H PRN  polyethylene glycol (GLYCOLAX) packet 17 g, Daily PRN  acetaminophen (TYLENOL) tablet 650 mg, Q6H PRN   Or  acetaminophen (TYLENOL) suppository 650 mg, Q6H PRN  oxyCODONE (ROXICODONE) immediate release tablet 5 mg, Q8H PRN  albuterol sulfate HFA (PROVENTIL;VENTOLIN;PROAIR) 108 (90 Base) MCG/ACT inhaler 1 puff, Q4H PRN   And  ipratropium (ATROVENT HFA) 17 MCG/ACT inhaler 1 puff, Q4H PRN        All medication charted and reviewed. CONSULTS      IP CONSULT TO NEUROSURGERY    ASSESSMENT/PLAN       Carina Mcclure is a 75 y.o. male who presents with proctalgia.      MRI obtained, appreciate neurosurgery recommendations  Noted to be trichomonas positive and received metronidazole  Consulting urology for anteriorly tender RAHEEM  Continue home medications and pain control  Monitor vitals, labs, and imaging  DISPO: pending consults and clinical improvement    --  Vero Dueñas MD  Emergency Medicine Resident Physician     This dictation was generated by voice recognition computer software. Although all attempts are made to edit the dictation for accuracy, there may be errors in the transcription that are not intended.

## 2023-09-09 NOTE — PROGRESS NOTES
[address]  CDU / OBSERVATION ENCOUNTER  ATTENDING NOTE       I performed a history and physical examination of the patient and discussed management with the resident or midlevel provider. I reviewed the resident or midlevel provider's note and agree with the documented findings and plan of care. Any areas of disagreement are noted on the chart. I was personally present for the key portions of any procedures. I have documented in the chart those procedures where I was not present during the key portions. I have reviewed the nurses notes. I agree with the chief complaint, past medical history, past surgical history, allergies, medications, social and family history as documented unless otherwise noted below. The Family history, social history, and ROS are effectively unchanged since admission unless noted elsewhere in the chart. This patient was placed in the observation unit for reevaluation for possible admission to the hospital    Patient with history of BPH. Patient has history of degenerative disc disease. Patient presents with rectal pain and pressure as if he is prolapsing. Patient describes burning pain with passing gas. Patient has extensive history of this pain with recent evaluation by general surgery and GI. Patient had exam under anesthesia on September 1 of this year which showed external hemorrhoids. Patient was also recently admitted to [facility] which she had CT abdomen pelvis showing severe diverticular disease and enlarged prostate. Colonoscopy 8/24/2023 demonstrated no masses or polyps and minimal external hemorrhoids. Patient has a history of back surgery. Patient with history of degenerative disease L4-L5. Patient has not had improvement despite multiple visits. Current work-up is included neurosurgical evaluation which recommends MRI    Patient with elevated PSA. Patient with pain on palpation of the prostate.   Patient has not had evaluation by urology

## 2023-09-17 ENCOUNTER — HOSPITAL ENCOUNTER (EMERGENCY)
Age: 73
Discharge: HOME | End: 2023-09-17
Attending: EMERGENCY MEDICINE
Payer: MEDICARE

## 2023-09-17 VITALS
TEMPERATURE: 97.6 F | DIASTOLIC BLOOD PRESSURE: 96 MMHG | OXYGEN SATURATION: 96 % | SYSTOLIC BLOOD PRESSURE: 160 MMHG | RESPIRATION RATE: 16 BRPM | HEART RATE: 92 BPM

## 2023-09-17 DIAGNOSIS — R14.3 FLATULENCE: ICD-10-CM

## 2023-09-17 DIAGNOSIS — K59.4 PROCTALGIA FUGAX: Primary | ICD-10-CM

## 2023-09-17 PROCEDURE — 99283 EMERGENCY DEPT VISIT LOW MDM: CPT | Performed by: EMERGENCY MEDICINE

## 2023-09-17 PROCEDURE — 6370000000 HC RX 637 (ALT 250 FOR IP)

## 2023-09-17 RX ORDER — SIMETHICONE 80 MG
80 TABLET,CHEWABLE ORAL ONCE
Status: COMPLETED | OUTPATIENT
Start: 2023-09-17 | End: 2023-09-17

## 2023-09-17 RX ADMIN — SIMETHICONE 80 MG: 80 TABLET, CHEWABLE ORAL at 22:43

## 2023-09-17 ASSESSMENT — ENCOUNTER SYMPTOMS
BLOOD IN STOOL: 0
DIARRHEA: 0
ABDOMINAL PAIN: 1
VOMITING: 0
CONSTIPATION: 0
RECTAL PAIN: 1
NAUSEA: 0

## 2023-09-18 ENCOUNTER — HOSPITAL ENCOUNTER (EMERGENCY)
Age: 73
Discharge: HOME | End: 2023-09-18
Attending: EMERGENCY MEDICINE
Payer: MEDICARE

## 2023-09-18 VITALS
RESPIRATION RATE: 18 BRPM | HEART RATE: 92 BPM | DIASTOLIC BLOOD PRESSURE: 97 MMHG | SYSTOLIC BLOOD PRESSURE: 155 MMHG | OXYGEN SATURATION: 95 %

## 2023-09-18 VITALS
HEART RATE: 97 BPM | SYSTOLIC BLOOD PRESSURE: 138 MMHG | DIASTOLIC BLOOD PRESSURE: 90 MMHG | TEMPERATURE: 97 F | OXYGEN SATURATION: 96 % | RESPIRATION RATE: 27 BRPM

## 2023-09-18 DIAGNOSIS — K62.89 RECTAL PAIN, CHRONIC: Primary | ICD-10-CM

## 2023-09-18 DIAGNOSIS — K59.4 PROCTALGIA FUGAX: Primary | ICD-10-CM

## 2023-09-18 DIAGNOSIS — G89.29 RECTAL PAIN, CHRONIC: Primary | ICD-10-CM

## 2023-09-18 PROCEDURE — 6370000000 HC RX 637 (ALT 250 FOR IP)

## 2023-09-18 PROCEDURE — 99283 EMERGENCY DEPT VISIT LOW MDM: CPT

## 2023-09-18 RX ORDER — ACETAMINOPHEN 500 MG
1000 TABLET ORAL ONCE
Status: DISCONTINUED | OUTPATIENT
Start: 2023-09-18 | End: 2023-09-18 | Stop reason: HOSPADM

## 2023-09-18 RX ORDER — OXYCODONE HYDROCHLORIDE 5 MG/1
5 TABLET ORAL EVERY 6 HOURS PRN
Qty: 9 TABLET | Refills: 0 | Status: SHIPPED | OUTPATIENT
Start: 2023-09-18 | End: 2023-09-21

## 2023-09-18 RX ORDER — OXYCODONE HYDROCHLORIDE 5 MG/1
5 TABLET ORAL ONCE
Status: COMPLETED | OUTPATIENT
Start: 2023-09-18 | End: 2023-09-18

## 2023-09-18 RX ADMIN — OXYCODONE HYDROCHLORIDE 5 MG: 5 TABLET ORAL at 01:34

## 2023-09-18 ASSESSMENT — ENCOUNTER SYMPTOMS
VOMITING: 0
DIARRHEA: 0
DIARRHEA: 1
BLOOD IN STOOL: 1
NAUSEA: 0
RECTAL PAIN: 1
SHORTNESS OF BREATH: 0
RECTAL PAIN: 1
ABDOMINAL PAIN: 0

## 2023-09-18 ASSESSMENT — PAIN SCALES - GENERAL: PAINLEVEL_OUTOF10: 10

## 2023-09-18 NOTE — ED NOTES
Met with patient at bedside. Patient unable to answer questions about his living situation. Patient reports that he has air coming out and pulled off brief. According to patient chart this is something that has been a concern for several years and patient has been evaluated many times. Patient may benefit from psych consult.

## 2023-09-18 NOTE — ED PROVIDER NOTES
[facility] ED  Emergency Department Encounter  Emergency Medicine Physician Note     Pt Esteban Rosario  MRN: [id number]  [address] [place] 1950  Date of evaluation: 9/18/23  PCP:  Arpit Wood MD  Note Started: 2:48 PM EDT      CHIEF COMPLAINT       Chief Complaint   Patient presents with    Rectal pressure       HISTORY OF PRESENT ILLNESS  (Location/Symptom, Timing/Onset, Context/Setting, Quality, Duration, Modifying Factors, Severity.)      Estefany Dempsey is a 75 y.o. male who presents with pain in his rectal area, describes the gas pushing a and also pushes up to his head. Patient describes severe pain in his rectum. Patient has been worked up multiple times including MRIs and CT scans and had a colonoscopy on 8/24/2023 that demonstrated no mass or polyps and external hemorrhoids. Patient is also had CT abdomen pelvis did describe diverticular disease. He describes the pain as \"ripping\". PAST MEDICAL / SURGICAL / SOCIAL / FAMILY HISTORY      has a past medical history of Arthritis and COPD (chronic obstructive pulmonary disease) ([address] No additional pertinent        has a past surgical history that includes Lumbar disc surgery and Circumcision.   No additional pertinent       Social History     Socioeconomic History    Marital status: Legally      Spouse name: Not on file    Number of children: Not on file    Years of education: Not on file    Highest education level: Not on file   Occupational History    Not on file   Tobacco Use    Smoking status: Former    Smokeless tobacco: Never   Substance and Sexual Activity    Alcohol use: No    Drug use: No    Sexual activity: Not on file   Other Topics Concern    Not on file   Social History Narrative    Not on file     Social Determinants of Health     Financial Resource Strain: Not on file   Food Insecurity: Not on file   Transportation Needs: Not on file   Physical Activity: Not on file   Stress: Not on file   Social Connections: Not on file

## 2023-09-18 NOTE — ED NOTES
Writer attempted to give meds and discharge patient, patient was laying prone toward right side in bed bouncing his pelvis on the bed, screaming \"There is something blowing out of my ass!! Check it!! Check it!!\" Writer checked patient and there was nothing noted. Writer attempted to calm patient, but patient was inconsolable. Writer updated provider and Jessica Postin.      Sadi Pruitt., RN  09/81/82 8336

## 2023-09-18 NOTE — ED PROVIDER NOTES
[address] [place] [address] ED  Emergency Department Encounter  Emergency Medicine Resident     Pt Matt Rodriguez  MRN: [id number]  [address] [facility] 1950  Date of evaluation: 9/18/23  PCP:  Delbert Latif MD  Note Started: 1:04 AM EDT      CHIEF COMPLAINT       Chief Complaint   Patient presents with    Rectal Pain       HISTORY OF PRESENT ILLNESS  (Location/Symptom, Timing/Onset, Context/Setting, Quality, Duration, Modifying Factors, Severity.)      Carina Mcclure is a 75 y.o. male who presents with severe rectal pain. Patient was seen in the emergency department earlier today approximately 2 hours prior to this visit. Patient describes Juan Larson blowing out my ass and then sucking in and like a tornado\". Patient describes intense rectal pain during this process while his legs are shaking. Patient has been seen numerous times in the emergency department, admitted to observation, seen by urology, neurosurgery and no abnormalities have been seen. Patient has had rectal exam under anesthesia which was within normal limits. Patient has had MRI of the sacrum and coccyx, CT of the abdomen pelvis all within normal limits. Patient was prescribed Cardizem cream without relief. PAST MEDICAL / SURGICAL / SOCIAL / FAMILY HISTORY      has a past medical history of Arthritis and COPD (chronic obstructive pulmonary disease) ([address] has a past surgical history that includes Lumbar disc surgery and Circumcision.       Social History     Socioeconomic History    Marital status: Legally      Spouse name: Not on file    Number of children: Not on file    Years of education: Not on file    Highest education level: Not on file   Occupational History    Not on file   Tobacco Use    Smoking status: Former    Smokeless tobacco: Never   Substance and Sexual Activity    Alcohol use: No    Drug use: No    Sexual activity: Not on file   Other Topics Concern    Not on file   Social History Narrative    Not on file

## 2023-09-18 NOTE — ED NOTES
Patient presents per EMS from [facility], patient stated to [facility] ass has blown out! \" Patient states to writer patient \"Has something coming out of my ass! \" Patient was soiled from stool when his pant removed, Albaro Rice, GALLITO assisting writer and no rectal masses, fissures or hemorrhoids noted and rectum was clear when patient adriana rectal area cleaned and patient placed in brief.       Yuko Jeong RN  93/69/03 5880

## 2023-09-18 NOTE — ED NOTES
Pt arrived to the ED through triage with c/o of rectum pain and belly swelling  Pt stated his rectum is full of air  Pt stated he was pooping \"black tarry balls\" today  Pt denies any medical hx  Pt connected to BP, pulse ox  Pt appears to be in no acute distress at this time     Rolando Lambert RN  09/17/23 1117

## 2023-09-18 NOTE — ED NOTES
Dr. Kayleigh Skinner spoke with patient's son, Lemuel Choudhury on the phone, patient is apparently essentially homeless, patient has been c/o this symptom for a year to a year and a half now, patient cannot be cared for by family members who are no longer willing to try. Dr. Kayleigh Skinner will go speak to the patient again and see if there is anything further we can do for him, if not, patient will have to discharge. Jennifer Boles Officer, accompanied Dr. Kayleigh Skinner to speak with patient.       Maurine Essex., RN  37/69/07 5224

## 2023-09-18 NOTE — ED TRIAGE NOTES
Pt presents to ED with Rectal pain. Pt denies injuries. Pt states he has a condition that causes air to enter is rectum and cause pain. Pt is alert and oriented.

## 2023-09-18 NOTE — ED NOTES
Patient had been unwilling to go get a psych evaluation, nothing further could be done for patient, Greta Barone, , saw patient out of facility as patient would not discharge willingly.       Duc Enciso., GALLITO  08/11/61 2505

## 2023-09-18 NOTE — DISCHARGE INSTRUCTIONS
You may take Tylenol for pain, follow directions on the side of body. PLEASE RETURN TO THE EMERGENCY DEPARTMENT IMMEDIATELY for worsening symptoms, increase in the amount of diarrhea you are having, abdominal pain, blood in your stool, vomiting up blood, persistent nausea and/or vomiting, or if you develop any concerning symptoms such as: high fever not relieved by acetaminophen (Tylenol) and/or ibuprofen (Motrin / Advil), chills, shortness of breath, chest pain, feeling of your heart fluttering or racing, numbness, loss of consciousness, weakness or tingling in the arms or legs or change in color of the extremities, changes in mental status, persistent headache, blurry vision, loss of bladder / bowel control, unable to follow up with your physician, or other any other care or concern.

## 2023-09-18 NOTE — ED PROVIDER NOTES
[facility] ED  Emergency Department Encounter  Emergency Medicine Resident     Pt Leopold Berliner  MRN: [id number]  [address] [facility] [place] 1950  Date of evaluation: 9/17/23  PCP:  Cuca Carney MD  Note Started: 10:26 PM EDT      CHIEF COMPLAINT       Chief Complaint   Patient presents with    Abdominal Pain       HISTORY OF PRESENT ILLNESS  (Location/Symptom, Timing/Onset, Context/Setting, Quality, Duration, Modifying Factors, Severity.)      Og Keith is a 75 y.o. male who presents with abdominal bloating and gas. Patient also states \"my rectum is blowing out\". 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 He states he was prescribed a cream but that does not help. This rectal pain is the same pain that he has been having for the last several months. He states his last bowel movement was yesterday, nonbloody. He denies any difficulty urinating. Denies any fevers or chills. PAST MEDICAL / SURGICAL / SOCIAL / FAMILY HISTORY      has a past medical history of Arthritis and COPD (chronic obstructive pulmonary disease) ([address] has a past surgical history that includes Lumbar disc surgery and Circumcision.     Social History     Socioeconomic History    Marital status: Legally      Spouse name: Not on file    Number of children: Not on file    Years of education: Not on file    Highest education level: Not on file   Occupational History    Not on file   Tobacco Use    Smoking status: Former    Smokeless tobacco: Never   Substance and Sexual Activity    Alcohol use: No    Drug use: No    Sexual activity: Not on file   Other Topics Concern    Not on file   Social History Narrative    Not on file     Social Determinants of Health     Financial Resource Strain: Not on file   Food Insecurity: Not on file   Transportation Needs: Not on file   Physical Activity: Not on file   Stress: Not on file   Social Connections: Not on file   Intimate Partner Violence: Not on file

## 2023-09-18 NOTE — ED NOTES
Jaqueline Barcenas went in and saw patient, states patient has brief down to ankles now and was not answering her. Writer went in to put patient in a new pull up brief after cleaning and patient kept screaming, \"IT'S STILL COMING OUT!!\" Repeatedly. There was nothing coming from the patient's rectum.       Raheem Arnold, RN  90/62/28 0826

## 2023-09-18 NOTE — DISCHARGE INSTRUCTIONS
You were seen in the emergency department for persistent rectal pain, abdominal bloating, and excessive gas. We gave you simethicone in the emergency department. You will need to continue to follow with urology for your proctalgia fugax. Recommend you continue to use the Cardizem cream that you were prescribed. Please follow-up with your primary care provider as needed. Please return to the emergency department if your symptoms worsen or if you develop any constipation, bloody stools, nausea, vomiting, or fevers.

## 2023-09-18 NOTE — DISCHARGE INSTRUCTIONS
You were seen today in the emergency department for your rectal pain. We now feel you are safe for discharge home. Please return to the emergency department immediately if develop any new or worsening concerns including chest pain, shortness of breath, abdominal pain, nausea, vomiting, diarrhea, weakness, loss consciousness, fever, chills, or any other concerns. Please call your PCP and schedule appointment within the next 24 to 48 hours for follow-up.

## 2023-09-20 ENCOUNTER — HOSPITAL ENCOUNTER (EMERGENCY)
Age: 73
Discharge: HOME | End: 2023-09-20
Attending: EMERGENCY MEDICINE
Payer: MEDICARE

## 2023-09-20 ENCOUNTER — APPOINTMENT (OUTPATIENT)
Dept: GENERAL RADIOLOGY | Age: 73
End: 2023-09-20
Payer: MEDICARE

## 2023-09-20 VITALS
RESPIRATION RATE: 17 BRPM | TEMPERATURE: 97 F | DIASTOLIC BLOOD PRESSURE: 84 MMHG | HEART RATE: 74 BPM | OXYGEN SATURATION: 99 % | SYSTOLIC BLOOD PRESSURE: 154 MMHG

## 2023-09-20 VITALS
TEMPERATURE: 98 F | OXYGEN SATURATION: 97 % | RESPIRATION RATE: 19 BRPM | HEART RATE: 97 BPM | DIASTOLIC BLOOD PRESSURE: 92 MMHG | SYSTOLIC BLOOD PRESSURE: 157 MMHG | BODY MASS INDEX: 32.28 KG/M2 | WEIGHT: 225 LBS

## 2023-09-20 VITALS
SYSTOLIC BLOOD PRESSURE: 169 MMHG | DIASTOLIC BLOOD PRESSURE: 92 MMHG | HEART RATE: 99 BPM | OXYGEN SATURATION: 94 % | TEMPERATURE: 99.4 F | RESPIRATION RATE: 20 BRPM

## 2023-09-20 DIAGNOSIS — K59.4 PROCTALGIA FUGAX: Primary | ICD-10-CM

## 2023-09-20 DIAGNOSIS — K62.89 PROCTALGIA: Primary | ICD-10-CM

## 2023-09-20 DIAGNOSIS — K62.89 RECTAL PAIN: ICD-10-CM

## 2023-09-20 DIAGNOSIS — K62.89 PROCTODYNIA: Primary | ICD-10-CM

## 2023-09-20 LAB
ANION GAP SERPL CALCULATED.3IONS-SCNC: 12 MMOL/L (ref 9–17)
BUN SERPL-MCNC: 23 MG/DL (ref 8–23)
CA-I BLD-SCNC: 1.2 MMOL/L (ref 1.13–1.33)
CALCIUM SERPL-MCNC: 8.6 MG/DL (ref 8.6–10.4)
CHLORIDE SERPL-SCNC: 107 MMOL/L (ref 98–107)
CO2 SERPL-SCNC: 22 MMOL/L (ref 20–31)
CREAT SERPL-MCNC: 0.9 MG/DL (ref 0.7–1.2)
GFR SERPL CREATININE-BSD FRML MDRD: >60 ML/MIN/1.73M2
GLUCOSE SERPL-MCNC: 98 MG/DL (ref 70–99)
POTASSIUM SERPL-SCNC: 4.4 MMOL/L (ref 3.7–5.3)
SODIUM SERPL-SCNC: 141 MMOL/L (ref 135–144)

## 2023-09-20 PROCEDURE — 6360000002 HC RX W HCPCS: Performed by: EMERGENCY MEDICINE

## 2023-09-20 PROCEDURE — 74018 RADEX ABDOMEN 1 VIEW: CPT

## 2023-09-20 PROCEDURE — 82330 ASSAY OF CALCIUM: CPT

## 2023-09-20 PROCEDURE — 99285 EMERGENCY DEPT VISIT HI MDM: CPT

## 2023-09-20 PROCEDURE — 6370000000 HC RX 637 (ALT 250 FOR IP)

## 2023-09-20 PROCEDURE — 80048 BASIC METABOLIC PNL TOTAL CA: CPT

## 2023-09-20 PROCEDURE — 99282 EMERGENCY DEPT VISIT SF MDM: CPT

## 2023-09-20 PROCEDURE — 6370000000 HC RX 637 (ALT 250 FOR IP): Performed by: STUDENT IN AN ORGANIZED HEALTH CARE EDUCATION/TRAINING PROGRAM

## 2023-09-20 RX ORDER — PRAMOXINE HYDROCHLORIDE 10 MG/G
AEROSOL, FOAM TOPICAL EVERY 4 HOURS PRN
Status: DISCONTINUED | OUTPATIENT
Start: 2023-09-20 | End: 2023-09-20 | Stop reason: HOSPADM

## 2023-09-20 RX ORDER — DICYCLOMINE HYDROCHLORIDE 10 MG/1
10 CAPSULE ORAL ONCE
Status: COMPLETED | OUTPATIENT
Start: 2023-09-20 | End: 2023-09-20

## 2023-09-20 RX ORDER — FENTANYL CITRATE 50 UG/ML
50 INJECTION, SOLUTION INTRAMUSCULAR; INTRAVENOUS ONCE
Status: COMPLETED | OUTPATIENT
Start: 2023-09-20 | End: 2023-09-20

## 2023-09-20 RX ADMIN — FENTANYL CITRATE 50 MCG: 50 INJECTION, SOLUTION INTRAMUSCULAR; INTRAVENOUS at 09:01

## 2023-09-20 RX ADMIN — DICYCLOMINE HYDROCHLORIDE 10 MG: 10 CAPSULE ORAL at 19:13

## 2023-09-20 RX ADMIN — PRAMOXINE HYDROCHLORIDE: 10 AEROSOL, FOAM TOPICAL at 06:29

## 2023-09-20 ASSESSMENT — ENCOUNTER SYMPTOMS
RECTAL PAIN: 1
ABDOMINAL PAIN: 0

## 2023-09-20 ASSESSMENT — PAIN - FUNCTIONAL ASSESSMENT
PAIN_FUNCTIONAL_ASSESSMENT: 0-10
PAIN_FUNCTIONAL_ASSESSMENT: 0-10

## 2023-09-20 ASSESSMENT — PAIN SCALES - GENERAL
PAINLEVEL_OUTOF10: 10
PAINLEVEL_OUTOF10: 10

## 2023-09-20 ASSESSMENT — PAIN DESCRIPTION - FREQUENCY: FREQUENCY: CONTINUOUS

## 2023-09-20 ASSESSMENT — PAIN DESCRIPTION - LOCATION: LOCATION: BUTTOCKS;RECTUM

## 2023-09-20 ASSESSMENT — PAIN DESCRIPTION - PAIN TYPE: TYPE: CHRONIC PAIN

## 2023-09-20 NOTE — ED NOTES
Pt still on bedside commode attempting to have a bowel movement. Pt states \"almost till the end\".      Moreno Bryan RN  09/20/23 2606

## 2023-09-20 NOTE — ED NOTES
and belongings search:     Persons present during search:   Results of search and disposition:       Searchers Name: nadia MARYANNE    These items or items similar should be removed from the room:   [x] Chairs   [x] Telephone   [x] Trash cans and liners   [x] Plastic utensils (order Patient Safety tray)   [x] Empty or remove Sharps containers   [x] All personal clothing/belongings removed   [x] All unnecessary lead wires, electrical cords, draw cords, etc.   [x] Flowers and plants   [x] Double check for lighters, matches, razors, any glass items etc that can be used as weapons. Person completing Checklist: Denver Rebel, RN       GENERAL INFORMATION     Y  N     [x] [] Has the patient been informed that they are on a watch and what that means? [x] [] Can the patient get out of Bed without nursing assistance? [] [x] Can the patient use the restroom without nursing assistance? [] [x] Can the patient walk the halls to [place] their legs? \"   [] [x] Does the patient have metal utensils? [x] [] Have the patient's belongings been placed out of control of the patient? [x] [] Have the patient and his/her belongings been checked for contraband? [] [x] Is the patient under any visitor restrictions? If Yes, explain:   [] [x] Is the patient under an alias? [address] [place] [address] Name:   Authorized visitors (no more than two are to be on the list)   Name/Relationship:   Name/Relationship:    Name of Staff member that you  Received this information from?: [facility]  General Description:    Flavia Pope 34/H31B male 75 y.o. Admission weight: 225 lb (102.1 kg)    Race: []  [x] Black  []   []   [] Middle [place] [] Other  Facial Hair:  [] Yes  [] No  If yes, please describe: Identifying Marks (i.e. Visible tattoos, scars, etc... ):     NURSING CARE PLAN    Nursing Diagnosis: Risk of Self Directed Harm  [] Actual  [x] Potential  Date Started: 9/20/23      Etiological Factors: (related to)  [x] Expressed or implied suicidal ideation/behavior  [] Depression  [x] Suicide attempt      [] Low self-esteem  [] Hallucinations      [] Feeling of Hopelessness  [] Substance abuse or withdrawal    [] Dysfunctional family  [] Major traumatic event, eg., divorce, etc   [] Excessive stress/anxiety    9/20/23    Expected Outcomes    Patient will:   [x] Patient will remain safe for the duration of their stay   [x] Patient's environment will be safe, eg. Free of potential suicide weapons   [] Verbalize Recovery from suicidal episode and improvement in self-worth   [x] Discuss feeling that precipitated suicide attempt/thoughts/behavior   [] Will describe available resources for crisis prevention and management   [] Will verbalize positive coping skills     Nursing Intervention   [x] Assessment and Observations hourly   [x] Suicide Precautions implemented with patient, should be 1:1 observation   [x] Document observation [id number] and RN assessment hourly   [] Consult physician for:    [] Psychiatric consult    [] Pharmacological therapy    [] Other:    [x] Patient search completed by security   [x] Initiated appropriate safety protocols by removing from the patient's environment anything that could be used to inflict self injury, eg. Order safe tray, snap gown, etc   [x] Maintain open, warm, caring, non-judgmental attitude/manner towards patient   [] Discuss advantages and disadvantages of existing coping methods/skills   [x] Assist and educate patient with identifying present strengths and coping skills   [x] Keep patient informed regarding plan of care and provide clear concise explanations. Provide the patient/family education information as well as telephone numbers and other information about crisis centers, hot lines, and counselors.     Discharge Planning:   [] Referral  [] Groups [] Health agencies  [] Other:            Osmar Merchant RN  09/20/23 7181

## 2023-09-20 NOTE — ED NOTES
Physician asked if SW could meet with pt to see why he hasn't been able to make it to his GI appointments. When I initially walked into the room pt was just laying on the bed and was very quiet. However, once he noticed that I came in he began moving around on the bed and yelling about his pain. Attempted to ask pt why he hasn't been going to his GI appointments and he said \"my rectal is about to burst.\" Asked pt multiple times if he had any transportation issues but he didn't respond to the question. Updated resident.           JOHN Baires  09/20/23 1073

## 2023-09-20 NOTE — ED PROVIDER NOTES
[address]  eMERGENCY dEPARTMENT eNCOUnter      Pt Name: Carina Mcclure  MRN: [id number]  [address] [facility] 1950  Date of evaluation: 9/20/23      CHIEF COMPLAINT       Chief Complaint   Patient presents with    Constipation         HISTORY OF PRESENT ILLNESS    Carina Mcclure is a 75 y.o. male who presents to the emergency department for the fifth time since yesterday with complaints of pain in his anus which is chronic. Patient was discharged from here several hours ago after being evaluated since early this morning. He also was seen at the emergency department at [facility] yesterday in addition to the 4 times he has been seen here. Patient has a longstanding history of similar symptoms and I evaluate this patient for the same problems 11 months ago. He has been seen by general surgeons as well as a colorectal surgeon without an obvious anatomic diagnosis. He has apparently undergone on determined surgery in an attempt to correct his symptoms. He apparently is also homeless and indicates he has been living in his car. He declined referral to a homeless shelter. No fevers or chills. No vomiting or diarrhea. No dysuria, hematuria, frequency, urgency. Location/Symptom: See above  Timing/Onset: See above  Context/Setting: See above  Quality: See above  Duration: See above  Modifying Factors: See above  Severity: See above      REVIEW OF SYSTEMS       See above    PAST MEDICAL HISTORY    has a past medical history of Arthritis and COPD (chronic obstructive pulmonary disease) ([address] SURGICAL HISTORY      has a past surgical history that includes Lumbar disc surgery and Circumcision.     CURRENT MEDICATIONS       Previous Medications    ACETAMINOPHEN (APAP EXTRA STRENGTH) 500 MG TABLET    Take 2 tablets by mouth every 6 hours as needed for Pain    CHOLECALCIFEROL (VITAMIN D-400) 400 UNIT TABS TABLET    Take 1 tablet by mouth daily    COMBIVENT RESPIMAT  MCG/ACT AERS INHALER    Inhale 1 puff

## 2023-09-20 NOTE — ED TRIAGE NOTES
Pt was seen in ER today and had extensive work up. Pt has had several other ER visits and work ups for the same.

## 2023-09-20 NOTE — DISCHARGE INSTRUCTIONS
Please follow-up with Gricelda. You should call her tomorrow to make arrangement to follow-up with her at the next available opportunity. You should avail yourself of the local homeless shelters rather than live in your car.

## 2023-09-20 NOTE — ED NOTES
Patient was discharged at 3:30pm today. [employer] and [employer] was provided to transport him to [address]. Patient missed his cab and is malingering. Patient reported he does not have any friends or family he can stay with. Patient then stated that the hospital is \"supposed to help people and they are not helping me. \" Patient then made inappropriate racial comments toward SW and officers. SW informed patient that he was medically cleared during last visit and did not qualify for admission or admission to snf. Patient stated \"I feel my insides are demonetizing inside of me. \" Patient stated it makes him feel suicidal. [place] [employer] was with social work during this statement. Officer Chance Ivy notified Charge RN of statement. She requested patient be assessed. Patient denied suicidal thoughts to physician. Patient will be discharged.        Katharine OwensK[facility]  09/20/23 6324 Acitretin Pregnancy And Lactation Text: This medication is Pregnancy Category X and should not be given to women who are pregnant or may become pregnant in the future. This medication is excreted in breast milk.

## 2023-09-20 NOTE — ED PROVIDER NOTES
[facility] EMERGENCY DEPT  [address] [place] [id number]  [phone]    Work/School Note    Date: 9/11/2022    To Whom It May concern:    Lisandro Guzman was seen and treated today in the emergency room by the following provider(s):  Attending Provider: Mandy Gramajo MD.      Lisandro Guzman is excused from work/school on 9/11/2022 through 9/13/2022. She is medically clear to return to work/school on 9/14/2022.          Sincerely,  Dr Xiomy Velasquez [address] [place] [address] ED  Emergency Department Encounter  Emergency Medicine Resident     Pt Leavy Paget  MRN: [id number]  [address] [facility] 1950  Date of evaluation: 9/20/23  PCP:  Andreina Perales MD  Note Started: 7:09 PM EDT      CHIEF COMPLAINT       Chief Complaint   Patient presents with    Suicidal       HISTORY OF PRESENT ILLNESS  (Location/Symptom, Timing/Onset, Context/Setting, Quality, Duration, Modifying Factors, Severity.)      Lino Plata is a 75 y.o. male who presents with rectal pain. Patient is not suicidal, he states that the pain in his rectum is so bad that he he would rather die. I take it that this is a figure of speech rather than actual suicidal intent, patient agrees that he is not actually suicidal.  Patient has been seen multiple times today for the same concern, he has multiple evaluations, he has had surgical evaluation, there was no acute pathology that warranted treatment. PAST MEDICAL / SURGICAL / SOCIAL / FAMILY HISTORY      has a past medical history of Arthritis and COPD (chronic obstructive pulmonary disease) ([address] has a past surgical history that includes Lumbar disc surgery and Circumcision.       Social History     Socioeconomic History    Marital status: Legally      Spouse name: Not on file    Number of children: Not on file    Years of education: Not on file    Highest education level: Not on file   Occupational History    Not on file   Tobacco Use    Smoking status: Former    Smokeless tobacco: Never   Substance and Sexual Activity    Alcohol use: No    Drug use: No    Sexual activity: Not on file   Other Topics Concern    Not on file   Social History Narrative    Not on file     Social Determinants of Health     Financial Resource Strain: Not on file   Food Insecurity: Not on file   Transportation Needs: Not on file   Physical Activity: Not on file   Stress: Not on file   Social Connections: Not on file   Intimate Partner Violence: Not on reevaluation. No further intervention warranted at this time given clinical stability, multiple evaluations completed previously without remarkable findings. [KK]   2020 Vital signs reviewed at discharge and were within normal limits for the patient. Patient was symptomatically improved and had no new complaints. Patient verbalized understanding of the plan. Patient was given follow up instructions and ER return precautions. Discharge paperwork was reviewed with the patient. Patient is appropriate for discharge and outpatient follow up. [KK]      ED Course User Index  [KK] Anmol Rueda DO       FINAL IMPRESSION      1.  Proctalgia          DISPOSITION / PLAN     DISPOSITION Decision To Discharge 09/20/2023 08:07:43 PM      PATIENT REFERRED TO:  [facility] ED  [address]  [phone]  Go to   As needed      DISCHARGE MEDICATIONS:  Current Discharge Medication List          Erin Jensen DO  Emergency Medicine Resident    (Please note that portions of this note were completed with a voice recognition program.  Efforts were made to edit the dictations but occasionally words are mis-transcribed.)        Anmol Rueda DO  Resident  09/20/23 2021

## 2023-09-20 NOTE — ED NOTES
The following labs were labeled with appropriate pt sticker and tubed to lab:     [x] Blue     [x] Lavender   [] on ice  [x] Green/yellow  [x] Green/black [x] on ice  [] Collie Hammed  [] on ice  [] Yellow  [] Red  [] Pink  [] Type/ Screen  [] ABG  [] VBG    [] COVID-19 swab    [] Rapid  [] PCR  [] Flu swab  [] Peds Viral Panel     [] Urine Sample  [] Fecal Sample  [] Pelvic Cultures  [] Blood Cultures  [] X 2  [] STREP Cultures       Dain Blas RN  09/20/23 1063

## 2023-09-20 NOTE — ED PROVIDER NOTES
This patient was signed out to me by Dr. Sabino Botello at the completion of his shift. Patient presents complaints of rectal pain. Patient has a longstanding history of multiple ED visits for similar complaints as well as previous evaluation or treatment by colorectal surgery for same with no clear diagnosis. Patient's been evaluated multiple times in the past 24 to 48 hours for the same complaints. Dr. Sabino Botello now reports that the patient is unable to ambulate and efforts are currently being made to discharge the patient to a skilled nursing facility given his inability to ambulate. Jan Lyons MD  09/20/23 8191    Should be noted that the patient has been observed ambulating in the emergency department the nursing staff. Review of the patient's medical records indicate that he was evaluated at [facility] emergency department yesterday and indicated to them that he was currently living in a trailer. He indicated to  at that visit that he was open to placement in nursing facility and indicated that they provided him information and resources to that effect. Discharge planning personnel here indicated that given that the patient is able to ambulate there is no justifiable reason for him to be admitted to a skilled nursing facility and patient will be discharged. I attempted multiple times to explain to the patient that there was no medical reason to admit him to the hospital however the patient refused to listen. Patient was able to weight-bear and ambulate. He refused to leave the examination room therefore was placed in a wheelchair and taken to the entrance to the emergency department.   A cab service was contacted and we are currently awaiting their arrival.         Jan Lyons MD  09/20/23 1025

## 2023-09-20 NOTE — ED PROVIDER NOTES
[address] HANDOFF       Handoff taken on the following patient from prior Attending Physician:  Pt Name: Fredy Camarena  PCP:  Kelly Zapata MD    Attestation  I was available and discussed any additional care issues that arose and coordinated the management plans with the resident(s) caring for the patient during my duty period. Any areas of disagreement with resident's documentation of care or procedures are noted on the chart. I was personally present for the key portions of any/all procedures during my duty period. I have documented in the chart those procedures where I was not present during the key portions.          CHIEF COMPLAINT       Chief Complaint   Patient presents with    Constipation         CURRENT MEDICATIONS     Previous Medications  Previous Medications    ACETAMINOPHEN (APAP EXTRA STRENGTH) 500 MG TABLET    Take 2 tablets by mouth every 6 hours as needed for Pain    CHOLECALCIFEROL (VITAMIN D-400) 400 UNIT TABS TABLET    Take 1 tablet by mouth daily    COMBIVENT RESPIMAT  MCG/ACT AERS INHALER    Inhale 1 puff into the lungs every 4 hours    DICYCLOMINE (BENTYL) 10 MG CAPSULE    Take 1 capsule by mouth every 6 hours as needed (cramps)    DIPHENHYDRAMINE (BENADRYL) 25 MG CAPSULE    Take 1 capsule by mouth nightly as needed for Itching    DOCUSATE SODIUM (COLACE) 100 MG CAPSULE    Take 1 capsule by mouth 2 times daily    FLUTICASONE (FLONASE) 50 MCG/ACT NASAL SPRAY    1 spray by Nasal route daily    GABAPENTIN (NEURONTIN) 300 MG CAPSULE    Take up top three times daily for nerve pain    HYDROCORTISONE (ANUSOL-HC) 25 MG SUPPOSITORY    Place 1 suppository rectally 2 times daily    IBUPROFEN (ADVIL;MOTRIN) 800 MG TABLET    Take 1 tablet by mouth every 8 hours as needed for Pain    MELOXICAM (MOBIC) 7.5 MG TABLET    One daily for pain    MULTIPLE VITAMINS-MINERALS (THERAPEUTIC MULTIVITAMIN-MINERALS) TABLET    Take 1 tablet by mouth daily    NAPROXEN (NAPROSYN)

## 2023-09-20 NOTE — ED NOTES
Pt arrived to ED with complaints of having a \"nerve in his rectum that is blowing him up and it is going to burst\"  Pt states that he has constipation as well  Pt has multiple ED arm bands from multiple hospitals.   Pt states that he has went to [address] [place] [address] for this   Was seen on 9/18/23 for the same thing here  Pt gyrating hips to the air stating that he is unable to control it   PT resting on stretcher, call [facility] given to pt, whiteboard updated      Francella Aase, RN  09/20/23 6529

## 2023-09-20 NOTE — ED NOTES
[employer] scheduled to transport to [facility].      [address] [facility], hospitals  09/20/23 0868

## 2023-09-20 NOTE — CARE COORDINATION
Spoke with pt. Discussed safety issues at home since onset on rectal pain. Pt would like SNF placement while awaiting colorectal consult appt. Pt does not have a preference of where he goes. Spoke with Tom Enriquez , referral manually faxed to [phone] .  Await pt/ot kathleen

## 2023-09-20 NOTE — ED NOTES
ED attending, writer, and police at bedside updating pt he is to be discharged. Pt screaming at staff- verbally aggressive. Pt dressed, IV out, and pt taken to lobby in wheelchair.      Marjan Lemus RN  09/20/23 9748

## 2023-09-20 NOTE — DISCHARGE INSTRUCTIONS
You were seen here today for rectal pain. This has been an ongoing issue. Please follow-up with Dr. Hari Jarquin, he is a colorectal surgeon. Please also follow-up with your primary care provider for further evaluation.

## 2023-09-20 NOTE — ED NOTES
Pt reports he never left the ER. Pt states nothing has changes since being discharged.       Lul Cortes RN  09/20/23 0549

## 2023-09-21 NOTE — DISCHARGE INSTRUCTIONS
You have been seen in the ER today for rectal pain. If you begin to experience any symptoms such as chest pain shortness of breath nausea vomiting dizziness drowsiness abdominal pain loss of consciousness or any other symptoms you find concerning please return to the ED for follow-up evaluation. If you have been given pain medication please take them only as prescribed. Do not take more medication than prescribed at any given time. Please follow-up with your primary care provider within 3-5 days for continued care, sooner if you have concerns. Your health status can be dramatically improved by changing your dietary habits. Simply by changing what you eat, your mood can improve, you can attain a healthy weight, your heart can become stronger, and you may experience an increase in overall energy and vitality. Please start to pay attention to what you eat on a daily basis. Start incorporating lots of colorful vegetables in your diet and reduce the amount of simple carbohydrates. Avoid eating simple processed sugars, such as cakes, cookies, ice cream etc. Additionally avoid foods high in processed fats, such as pizza, burgers, hot dogs, or anything deep fried etc. Complex carbohydrates can help control blood pressure and diabetes and may lower the risk of heart disease. Lean protein sources help build muscle and are essential for many body functions. Healthy fats help with hormonal balance and may help improve cholesterol levels. High fiber intake improves overall gut health, reduces blood pressure, and may optimize overall health. Gradually making these dietary lifestyle changes will dramatically improve your health and may reduce the number of hospital visits you will require over the course of your lifetime.      Choose from the following list of complex carbohydrates to include with each meal(30-40% of total calories per day, 4 calories per gram)  -Sweet potato  -quinoa  -brown rice  -oatmeal  -barley    Include

## 2023-09-21 NOTE — ED NOTES
SW booked pt transport to [address] via [employer] transport services.  Conf #[id number]     Kerri Sherman, JOHN  09/20/23 4420

## 2023-09-23 ENCOUNTER — HOSPITAL ENCOUNTER (EMERGENCY)
Age: 73
Discharge: HOME | End: 2023-09-23
Attending: STUDENT IN AN ORGANIZED HEALTH CARE EDUCATION/TRAINING PROGRAM
Payer: MEDICARE

## 2023-09-23 VITALS
HEART RATE: 95 BPM | TEMPERATURE: 98 F | BODY MASS INDEX: 32.21 KG/M2 | SYSTOLIC BLOOD PRESSURE: 146 MMHG | HEIGHT: 70 IN | DIASTOLIC BLOOD PRESSURE: 62 MMHG | WEIGHT: 225 LBS | OXYGEN SATURATION: 98 % | RESPIRATION RATE: 20 BRPM

## 2023-09-23 DIAGNOSIS — K62.89 PROCTALGIA: Primary | ICD-10-CM

## 2023-09-23 LAB
DATE, STOOL #1: NORMAL
HEMOCCULT SP1 STL QL: NEGATIVE
TIME, STOOL #1: 2130

## 2023-09-23 PROCEDURE — 82272 OCCULT BLD FECES 1-3 TESTS: CPT

## 2023-09-23 PROCEDURE — 96372 THER/PROPH/DIAG INJ SC/IM: CPT

## 2023-09-23 PROCEDURE — 99285 EMERGENCY DEPT VISIT HI MDM: CPT

## 2023-09-23 PROCEDURE — 6360000002 HC RX W HCPCS

## 2023-09-23 RX ORDER — DICYCLOMINE HYDROCHLORIDE 10 MG/ML
20 INJECTION INTRAMUSCULAR ONCE
Status: COMPLETED | OUTPATIENT
Start: 2023-09-23 | End: 2023-09-23

## 2023-09-23 RX ORDER — DICYCLOMINE HCL 20 MG
20 TABLET ORAL 4 TIMES DAILY
Qty: 60 TABLET | Refills: 0 | Status: SHIPPED | OUTPATIENT
Start: 2023-09-23 | End: 2023-10-08

## 2023-09-23 RX ADMIN — DICYCLOMINE HYDROCHLORIDE 20 MG: 20 INJECTION, SOLUTION INTRAMUSCULAR at 21:24

## 2023-09-23 ASSESSMENT — LIFESTYLE VARIABLES
HOW OFTEN DO YOU HAVE A DRINK CONTAINING ALCOHOL: NEVER
HOW MANY STANDARD DRINKS CONTAINING ALCOHOL DO YOU HAVE ON A TYPICAL DAY: PATIENT DOES NOT DRINK

## 2023-09-23 ASSESSMENT — PAIN - FUNCTIONAL ASSESSMENT: PAIN_FUNCTIONAL_ASSESSMENT: 0-10

## 2023-09-23 ASSESSMENT — PAIN SCALES - GENERAL: PAINLEVEL_OUTOF10: 10

## 2023-09-24 ASSESSMENT — ENCOUNTER SYMPTOMS
VOMITING: 0
ABDOMINAL PAIN: 0
RECTAL PAIN: 1
NAUSEA: 0

## 2023-09-24 NOTE — DISCHARGE INSTRUCTIONS
Seen in the emergency department for toe pain. It is very important that you follow-up with the colorectal surgeon at your regular scheduled appointment. We will give you prescription for Bentyl, please take this as needed for symptomatic relief. PLEASE RETURN TO THE EMERGENCY DEPARTMENT IMMEDIATELY for worsening symptoms, or if you develop any concerning symptoms such as: high fever not relieved by acetaminophen (Tylenol) and/or ibuprofen (Motrin), chills, shortness of breath, chest pain, persistent nausea and/or vomiting, numbness, weakness or tingling in the arms or legs or change in color of the extremities, changes in mental status, persistent headache, blurry vision. Return within 8 - 12 hours if you have any of the following: worsening of pain in your abdomen, no food sounds good to you, you continue to vomit, pain goes to your back or testicles, have pain in the abdomen when going over a bump in the car or when you jump up and down, inability to urinate, unable to follow up with your physician, or other any other care or concern.

## 2023-09-24 NOTE — ED PROVIDER NOTES
[address] [facility] ED  Emergency Department Encounter  Emergency Medicine Resident     Pt Miquel Kaur  MRN: [id number]  [address] [facility] [place] 1950  Date of evaluation: 9/23/23  PCP:  Cash Kamara MD  Note Started: 9:56 PM EDT      CHIEF COMPLAINT       Chief Complaint   Patient presents with    Rectal Pain       HISTORY OF PRESENT ILLNESS  (Location/Symptom, Timing/Onset, Context/Setting, Quality, Duration, Modifying Factors, Severity.)      Azael Prakash is a 75 y.o. male who presents with rectal pain. Patient states that he has had rectal pain for approximately 1 month, has been seen in the emergency department multiple times for this same complaint. Patient is hard of hearing, is a very poor historian. When asked about his symptoms he is just continually saying my rectum feels like it is exploding, a lot of pressure. Was seen on 9/20/2023 for the same problem. At that time received Bentyl with some relief in his symptoms. Patient has had multiple imaging done, including CT and MRI. Additionally patient has been referred to colorectal surgery, patient states that he has appointment scheduled in 3 days. PAST MEDICAL / SURGICAL / SOCIAL / FAMILY HISTORY      has a past medical history of Arthritis and COPD (chronic obstructive pulmonary disease) ([address] has a past surgical history that includes Lumbar disc surgery and Circumcision.       Social History     Socioeconomic History    Marital status: Legally      Spouse name: Not on file    Number of children: Not on file    Years of education: Not on file    Highest education level: Not on file   Occupational History    Not on file   Tobacco Use    Smoking status: Former    Smokeless tobacco: Never   Substance and Sexual Activity    Alcohol use: No    Drug use: No    Sexual activity: Not on file   Other Topics Concern    Not on file   Social History Narrative    Not on file     Social Determinants of Health     Financial Resource Strain: Not

## 2025-03-20 ENCOUNTER — APPOINTMENT (OUTPATIENT)
Dept: GENERAL RADIOLOGY | Age: 75
End: 2025-03-20
Payer: COMMERCIAL

## 2025-03-20 ENCOUNTER — HOSPITAL ENCOUNTER (EMERGENCY)
Age: 75
Discharge: HOME | End: 2025-03-21
Attending: EMERGENCY MEDICINE
Payer: COMMERCIAL

## 2025-03-20 VITALS
BODY MASS INDEX: 34.26 KG/M2 | RESPIRATION RATE: 22 BRPM | DIASTOLIC BLOOD PRESSURE: 100 MMHG | WEIGHT: 244.71 LBS | HEIGHT: 71 IN | HEART RATE: 93 BPM | TEMPERATURE: 98.2 F | OXYGEN SATURATION: 97 % | SYSTOLIC BLOOD PRESSURE: 122 MMHG

## 2025-03-20 DIAGNOSIS — K62.89 RECTAL PAIN: Primary | ICD-10-CM

## 2025-03-20 LAB
ALBUMIN SERPL-MCNC: 3.8 G/DL (ref 3.5–5.2)
ALBUMIN/GLOB SERPL: 1 {RATIO} (ref 1–2.5)
ALP SERPL-CCNC: 63 U/L (ref 40–129)
ALT SERPL-CCNC: 67 U/L (ref 5–41)
ANION GAP SERPL CALCULATED.3IONS-SCNC: 10 MMOL/L (ref 9–17)
AST SERPL-CCNC: 38 U/L
BASOPHILS # BLD: 0 K/UL (ref 0–0.2)
BASOPHILS NFR BLD: 1 % (ref 0–2)
BILIRUB DIRECT SERPL-MCNC: 0.2 MG/DL
BILIRUB INDIRECT SERPL-MCNC: 0.3 MG/DL (ref 0–1)
BILIRUB SERPL-MCNC: 0.5 MG/DL (ref 0.3–1.2)
BUN SERPL-MCNC: 14 MG/DL (ref 8–23)
CALCIUM SERPL-MCNC: 8.9 MG/DL (ref 8.6–10.4)
CHLORIDE SERPL-SCNC: 104 MMOL/L (ref 98–107)
CO2 SERPL-SCNC: 23 MMOL/L (ref 20–31)
CREAT SERPL-MCNC: 1 MG/DL (ref 0.7–1.2)
EOSINOPHIL # BLD: 0.1 K/UL (ref 0–0.4)
EOSINOPHILS RELATIVE PERCENT: 3 % (ref 1–4)
ERYTHROCYTE [DISTWIDTH] IN BLOOD BY AUTOMATED COUNT: 13.9 % (ref 12.5–15.4)
GFR, ESTIMATED: 78 ML/MIN/1.73M2
GLUCOSE SERPL-MCNC: 116 MG/DL (ref 70–99)
HCT VFR BLD AUTO: 42.4 % (ref 41–53)
HGB BLD-MCNC: 14.1 G/DL (ref 13.5–17.5)
LIPASE SERPL-CCNC: 59 U/L (ref 13–60)
LYMPHOCYTES NFR BLD: 2 K/UL (ref 1–4.8)
LYMPHOCYTES RELATIVE PERCENT: 34 % (ref 24–44)
MCH RBC QN AUTO: 31.2 PG (ref 26–34)
MCHC RBC AUTO-ENTMCNC: 33.4 G/DL (ref 31–37)
MCV RBC AUTO: 93.4 FL (ref 80–100)
MONOCYTES NFR BLD: 0.6 K/UL (ref 0.1–1.2)
MONOCYTES NFR BLD: 10 % (ref 2–11)
NEUTROPHILS NFR BLD: 52 % (ref 36–66)
NEUTS SEG NFR BLD: 3.1 K/UL (ref 1.8–7.7)
PLATELET # BLD AUTO: 182 K/UL (ref 140–450)
PMV BLD AUTO: 9.2 FL (ref 6–12)
POTASSIUM SERPL-SCNC: 4.5 MMOL/L (ref 3.7–5.3)
PROT SERPL-MCNC: 7.8 G/DL (ref 6.4–8.3)
RBC # BLD AUTO: 4.53 M/UL (ref 4.5–5.9)
SODIUM SERPL-SCNC: 137 MMOL/L (ref 135–144)
WBC OTHER # BLD: 5.9 K/UL (ref 3.5–11)

## 2025-03-20 PROCEDURE — 74018 RADEX ABDOMEN 1 VIEW: CPT

## 2025-03-20 PROCEDURE — 99284 EMERGENCY DEPT VISIT MOD MDM: CPT

## 2025-03-20 PROCEDURE — 80048 BASIC METABOLIC PNL TOTAL CA: CPT

## 2025-03-20 PROCEDURE — 36415 COLL VENOUS BLD VENIPUNCTURE: CPT

## 2025-03-20 PROCEDURE — 83690 ASSAY OF LIPASE: CPT

## 2025-03-20 PROCEDURE — 80076 HEPATIC FUNCTION PANEL: CPT

## 2025-03-20 PROCEDURE — 85025 COMPLETE CBC W/AUTO DIFF WBC: CPT

## 2025-03-20 RX ORDER — DICYCLOMINE HCL 20 MG
20 TABLET ORAL 4 TIMES DAILY
Qty: 60 TABLET | Refills: 0 | Status: SHIPPED | OUTPATIENT
Start: 2025-03-20 | End: 2025-04-04

## 2025-03-20 ASSESSMENT — PAIN DESCRIPTION - DESCRIPTORS: DESCRIPTORS: BURNING

## 2025-03-20 ASSESSMENT — PAIN DESCRIPTION - LOCATION: LOCATION: RECTUM

## 2025-03-20 ASSESSMENT — PAIN SCALES - GENERAL: PAINLEVEL_OUTOF10: 10

## 2025-03-20 ASSESSMENT — PAIN - FUNCTIONAL ASSESSMENT: PAIN_FUNCTIONAL_ASSESSMENT: 0-10

## 2025-03-20 ASSESSMENT — PAIN DESCRIPTION - PAIN TYPE: TYPE: ACUTE PAIN

## 2025-03-21 ASSESSMENT — ENCOUNTER SYMPTOMS
SHORTNESS OF BREATH: 0
EYE REDNESS: 0
DIARRHEA: 0
CONSTIPATION: 0
RECTAL PAIN: 1
COUGH: 0
NAUSEA: 0

## 2025-03-21 NOTE — ED PROVIDER NOTES
[facility] Emergency Department  [address] [facility].  [facility] [address]  Phone: [phone]  Fax: [phone]      Patient Name:  Austin Hurtado  Medical Record Number:  [id number]  YOB: 1950  Date of Service:  3/20/2025    CHIEF COMPLAINT:       Chief Complaint   Patient presents with    Foreign Body in Rectum     Suspected rectal foreign body.  Pt arrives from [facility].  Pt trying to describe pain in rectum and possible foreign body or \"chemical reaction\" in buttocks.        HISTORY OF PRESENT ILLNESS:   Austin Hurtado is a 75 y.o. male who presents with the complaint of:  Patient presents to the Emergency Department complaining of rectal pain.  States that it has been ongoing for some time.  Patient facility thinks that patient may have a possible foreign body.  Patient states that he did not place anything in his rectum.  Patient states that he has some pain at his rectum.  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  Previous notes were reviewed and patient has had an exam under anesthesia where there was no specific abnormalities.        REVIEW OF SYSTEMS:     Review of Systems   Constitutional:  Negative for chills and fever.   Eyes:  Negative for redness.   Respiratory:  Negative for cough and shortness of breath.    Cardiovascular:  Negative for chest pain and palpitations.   Gastrointestinal:  Positive for rectal pain. Negative for constipation, diarrhea and nausea.   Genitourinary:  Negative for dysuria and hematuria.   Musculoskeletal:  Negative for neck stiffness.   Skin:  Negative for rash.   Neurological:  Negative for weakness, numbness and headaches.   Psychiatric/Behavioral:  Negative for agitation.        CURRENT MEDICATIONS:      Discharge Medication List as of 3/21/2025  1:48 AM        CONTINUE these medications which have NOT CHANGED    Details   dicyclomine (BENTYL) 10 MG capsule Take 1 capsule by mouth every 6 hours as needed  needed, If symptoms worsen      DISCHARGE MEDICATIONS:     Discharge Medication List as of 3/21/2025  1:48 AM          Abdulkadir Landeros DO [facility]  Emergency Physician Attending    This note was created using Dragon dictation software. The note was briefly reviewed and proofread, but may contain some grammatical and phonetic errors.       Abdulkadir Landeros DO  03/21/25 0159

## 2025-03-21 NOTE — DISCHARGE INSTRUCTIONS
If you had imaging today, your results are:  XR ABDOMEN (KUB) (SINGLE AP VIEW)   Final Result   Mild constipation with a non-specific gas pattern.      No obvious radiopaque foreign body identified in the visualized abdomen and   pelvis.      No urinary calculi seen.             Please understand that at this time there is no evidence for a more serious underlying process, but that early in the process of an illness or injury, an emergency department workup can be falsely reassuring.  You should contact your family doctor within the next 24 hours for a follow up appointment. If you do not have one, we have attached the \"[facility] Same Day\" Physician line for you to call and they can provide you with one ([phone]). .    THANK YOU!    From [facility] and [facility]    On behalf of the Emergency Department staff at [facility], I would like to thank you for giving us the opportunity to address your health care needs and concerns.    We hope that during your visit, our service was delivered in a professional and caring manner. Please keep [facility] in mind as we walk with you down the path to your own personal wellness.     Please understand that early in the process of an illness or injury, an emergency department workup can be falsely reassuring.  If you notice any worsening, changing or persistent symptoms please call your family doctor or return to the ER immediately.     Tell us how we did during your visit at [email or web address] and let us know about your experience

## 2025-04-04 ENCOUNTER — HOSPITAL ENCOUNTER (OUTPATIENT)
Age: 75
Setting detail: SPECIMEN
Discharge: HOME | End: 2025-04-04